# Patient Record
Sex: FEMALE | Race: BLACK OR AFRICAN AMERICAN | NOT HISPANIC OR LATINO
[De-identification: names, ages, dates, MRNs, and addresses within clinical notes are randomized per-mention and may not be internally consistent; named-entity substitution may affect disease eponyms.]

---

## 2018-03-05 PROBLEM — Z00.00 ENCOUNTER FOR PREVENTIVE HEALTH EXAMINATION: Status: ACTIVE | Noted: 2018-03-05

## 2018-04-02 ENCOUNTER — APPOINTMENT (OUTPATIENT)
Dept: CARDIOLOGY | Facility: CLINIC | Age: 83
End: 2018-04-02

## 2018-06-14 ENCOUNTER — OUTPATIENT (OUTPATIENT)
Dept: OUTPATIENT SERVICES | Facility: HOSPITAL | Age: 83
LOS: 1 days | End: 2018-06-14
Payer: MEDICARE

## 2018-06-14 VITALS
TEMPERATURE: 98 F | OXYGEN SATURATION: 97 % | HEART RATE: 82 BPM | RESPIRATION RATE: 20 BRPM | SYSTOLIC BLOOD PRESSURE: 169 MMHG | HEIGHT: 66 IN | WEIGHT: 132.06 LBS | DIASTOLIC BLOOD PRESSURE: 81 MMHG

## 2018-06-14 DIAGNOSIS — E89.0 POSTPROCEDURAL HYPOTHYROIDISM: Chronic | ICD-10-CM

## 2018-06-14 DIAGNOSIS — Z90.710 ACQUIRED ABSENCE OF BOTH CERVIX AND UTERUS: Chronic | ICD-10-CM

## 2018-06-14 DIAGNOSIS — Z98.49 CATARACT EXTRACTION STATUS, UNSPECIFIED EYE: Chronic | ICD-10-CM

## 2018-06-14 DIAGNOSIS — R94.39 ABNORMAL RESULT OF OTHER CARDIOVASCULAR FUNCTION STUDY: ICD-10-CM

## 2018-06-14 LAB
ANION GAP SERPL CALC-SCNC: 14 MMOL/L — SIGNIFICANT CHANGE UP (ref 5–17)
BUN SERPL-MCNC: 22 MG/DL — SIGNIFICANT CHANGE UP (ref 7–23)
CALCIUM SERPL-MCNC: 9.6 MG/DL — SIGNIFICANT CHANGE UP (ref 8.4–10.5)
CHLORIDE SERPL-SCNC: 99 MMOL/L — SIGNIFICANT CHANGE UP (ref 96–108)
CO2 SERPL-SCNC: 28 MMOL/L — SIGNIFICANT CHANGE UP (ref 22–31)
CREAT SERPL-MCNC: 1.5 MG/DL — HIGH (ref 0.5–1.3)
GLUCOSE BLDC GLUCOMTR-MCNC: 132 MG/DL — HIGH (ref 70–99)
GLUCOSE SERPL-MCNC: 134 MG/DL — HIGH (ref 70–99)
HCT VFR BLD CALC: 38.9 % — SIGNIFICANT CHANGE UP (ref 34.5–45)
HGB BLD-MCNC: 12.7 G/DL — SIGNIFICANT CHANGE UP (ref 11.5–15.5)
MCHC RBC-ENTMCNC: 26.2 PG — LOW (ref 27–34)
MCHC RBC-ENTMCNC: 32.6 GM/DL — SIGNIFICANT CHANGE UP (ref 32–36)
MCV RBC AUTO: 80.2 FL — SIGNIFICANT CHANGE UP (ref 80–100)
PLATELET # BLD AUTO: 299 K/UL — SIGNIFICANT CHANGE UP (ref 150–400)
POTASSIUM SERPL-MCNC: 4.2 MMOL/L — SIGNIFICANT CHANGE UP (ref 3.5–5.3)
POTASSIUM SERPL-SCNC: 4.2 MMOL/L — SIGNIFICANT CHANGE UP (ref 3.5–5.3)
RBC # BLD: 4.85 M/UL — SIGNIFICANT CHANGE UP (ref 3.8–5.2)
RBC # FLD: 19.1 % — HIGH (ref 10.3–14.5)
SODIUM SERPL-SCNC: 141 MMOL/L — SIGNIFICANT CHANGE UP (ref 135–145)
WBC # BLD: 5.6 K/UL — SIGNIFICANT CHANGE UP (ref 3.8–10.5)
WBC # FLD AUTO: 5.6 K/UL — SIGNIFICANT CHANGE UP (ref 3.8–10.5)

## 2018-06-14 PROCEDURE — 93454 CORONARY ARTERY ANGIO S&I: CPT

## 2018-06-14 PROCEDURE — C1887: CPT

## 2018-06-14 PROCEDURE — 93454 CORONARY ARTERY ANGIO S&I: CPT | Mod: 26

## 2018-06-14 PROCEDURE — 93005 ELECTROCARDIOGRAM TRACING: CPT

## 2018-06-14 PROCEDURE — 85027 COMPLETE CBC AUTOMATED: CPT

## 2018-06-14 PROCEDURE — 80048 BASIC METABOLIC PNL TOTAL CA: CPT

## 2018-06-14 PROCEDURE — 82962 GLUCOSE BLOOD TEST: CPT

## 2018-06-14 PROCEDURE — C1894: CPT

## 2018-06-14 PROCEDURE — 93010 ELECTROCARDIOGRAM REPORT: CPT

## 2018-06-14 PROCEDURE — C1769: CPT

## 2018-06-14 NOTE — H&P CARDIOLOGY - HISTORY OF PRESENT ILLNESS
echo showed severe LV dysfunction & stress test done reveals ischemia in LAD with EF ~30% 89 y/o female with PMHx of HTN, DM type 2, HLD, now p/w c/o HART / SOB seen & evaluated bu Dr Capellan who in turn referred her to cardiologist Dr medina, echo showed severe LV dysfunction & stress test done reveals ischemia in LAD with EF ~30% & now for cardiac cath.

## 2018-06-14 NOTE — H&P CARDIOLOGY - PMH
CAD (coronary artery disease)    Diabetes mellitus, type 2    HLD (hyperlipidemia)    HTN (hypertension), benign    Hypothyroidism

## 2022-01-01 ENCOUNTER — INPATIENT (INPATIENT)
Facility: HOSPITAL | Age: 87
LOS: 3 days | Discharge: HOSPICE | End: 2022-02-17
Attending: FAMILY MEDICINE | Admitting: FAMILY MEDICINE
Payer: MEDICARE

## 2022-01-01 ENCOUNTER — INPATIENT (INPATIENT)
Facility: HOSPITAL | Age: 87
LOS: 0 days | End: 2022-02-18
Attending: INTERNAL MEDICINE | Admitting: INTERNAL MEDICINE
Payer: OTHER MISCELLANEOUS

## 2022-01-01 ENCOUNTER — TRANSCRIPTION ENCOUNTER (OUTPATIENT)
Age: 87
End: 2022-01-01

## 2022-01-01 VITALS
SYSTOLIC BLOOD PRESSURE: 127 MMHG | OXYGEN SATURATION: 98 % | WEIGHT: 134.92 LBS | RESPIRATION RATE: 22 BRPM | DIASTOLIC BLOOD PRESSURE: 75 MMHG | HEART RATE: 80 BPM | HEIGHT: 66 IN

## 2022-01-01 VITALS — RESPIRATION RATE: 20 BRPM | OXYGEN SATURATION: 95 %

## 2022-01-01 VITALS — DIASTOLIC BLOOD PRESSURE: 71 MMHG | SYSTOLIC BLOOD PRESSURE: 96 MMHG | HEART RATE: 103 BPM

## 2022-01-01 VITALS — HEART RATE: 57 BPM | RESPIRATION RATE: 10 BRPM | TEMPERATURE: 96 F | OXYGEN SATURATION: 94 %

## 2022-01-01 DIAGNOSIS — Z29.9 ENCOUNTER FOR PROPHYLACTIC MEASURES, UNSPECIFIED: ICD-10-CM

## 2022-01-01 DIAGNOSIS — E78.5 HYPERLIPIDEMIA, UNSPECIFIED: ICD-10-CM

## 2022-01-01 DIAGNOSIS — G93.41 METABOLIC ENCEPHALOPATHY: ICD-10-CM

## 2022-01-01 DIAGNOSIS — Z45.2 ENCOUNTER FOR ADJUSTMENT AND MANAGEMENT OF VASCULAR ACCESS DEVICE: ICD-10-CM

## 2022-01-01 DIAGNOSIS — R79.89 OTHER SPECIFIED ABNORMAL FINDINGS OF BLOOD CHEMISTRY: ICD-10-CM

## 2022-01-01 DIAGNOSIS — Z98.49 CATARACT EXTRACTION STATUS, UNSPECIFIED EYE: Chronic | ICD-10-CM

## 2022-01-01 DIAGNOSIS — Z90.710 ACQUIRED ABSENCE OF BOTH CERVIX AND UTERUS: Chronic | ICD-10-CM

## 2022-01-01 DIAGNOSIS — E89.0 POSTPROCEDURAL HYPOTHYROIDISM: Chronic | ICD-10-CM

## 2022-01-01 DIAGNOSIS — R77.8 OTHER SPECIFIED ABNORMALITIES OF PLASMA PROTEINS: ICD-10-CM

## 2022-01-01 DIAGNOSIS — N17.9 ACUTE KIDNEY FAILURE, UNSPECIFIED: ICD-10-CM

## 2022-01-01 DIAGNOSIS — I50.23 ACUTE ON CHRONIC SYSTOLIC (CONGESTIVE) HEART FAILURE: ICD-10-CM

## 2022-01-01 DIAGNOSIS — I10 ESSENTIAL (PRIMARY) HYPERTENSION: ICD-10-CM

## 2022-01-01 DIAGNOSIS — Z51.5 ENCOUNTER FOR PALLIATIVE CARE: ICD-10-CM

## 2022-01-01 DIAGNOSIS — E03.9 HYPOTHYROIDISM, UNSPECIFIED: ICD-10-CM

## 2022-01-01 DIAGNOSIS — R06.02 SHORTNESS OF BREATH: ICD-10-CM

## 2022-01-01 LAB
A1C WITH ESTIMATED AVERAGE GLUCOSE RESULT: 6.5 % — HIGH (ref 4–5.6)
ALBUMIN SERPL ELPH-MCNC: 2.7 G/DL — LOW (ref 3.3–5)
ALBUMIN SERPL ELPH-MCNC: 2.7 G/DL — LOW (ref 3.3–5)
ALP SERPL-CCNC: 153 U/L — HIGH (ref 40–120)
ALP SERPL-CCNC: 166 U/L — HIGH (ref 40–120)
ALT FLD-CCNC: 20 U/L — SIGNIFICANT CHANGE UP (ref 12–78)
ALT FLD-CCNC: 23 U/L — SIGNIFICANT CHANGE UP (ref 12–78)
ANA PAT FLD IF-IMP: ABNORMAL
ANA TITR SER: ABNORMAL
ANION GAP SERPL CALC-SCNC: 8 MMOL/L — SIGNIFICANT CHANGE UP (ref 5–17)
ANION GAP SERPL CALC-SCNC: 9 MMOL/L — SIGNIFICANT CHANGE UP (ref 5–17)
ANION GAP SERPL CALC-SCNC: 9 MMOL/L — SIGNIFICANT CHANGE UP (ref 5–17)
ANISOCYTOSIS BLD QL: SIGNIFICANT CHANGE UP
APPEARANCE UR: CLEAR — SIGNIFICANT CHANGE UP
AST SERPL-CCNC: 34 U/L — SIGNIFICANT CHANGE UP (ref 15–37)
AST SERPL-CCNC: 40 U/L — HIGH (ref 15–37)
BACTERIA # UR AUTO: ABNORMAL
BASOPHILS # BLD AUTO: 0 K/UL — SIGNIFICANT CHANGE UP (ref 0–0.2)
BASOPHILS # BLD AUTO: 0.02 K/UL — SIGNIFICANT CHANGE UP (ref 0–0.2)
BASOPHILS NFR BLD AUTO: 0 % — SIGNIFICANT CHANGE UP (ref 0–2)
BASOPHILS NFR BLD AUTO: 0.3 % — SIGNIFICANT CHANGE UP (ref 0–2)
BILIRUB SERPL-MCNC: 0.9 MG/DL — SIGNIFICANT CHANGE UP (ref 0.2–1.2)
BILIRUB SERPL-MCNC: 1.3 MG/DL — HIGH (ref 0.2–1.2)
BILIRUB UR-MCNC: ABNORMAL
BUN SERPL-MCNC: 71 MG/DL — HIGH (ref 7–23)
BUN SERPL-MCNC: 81 MG/DL — HIGH (ref 7–23)
BUN SERPL-MCNC: 89 MG/DL — HIGH (ref 7–23)
CALCIUM SERPL-MCNC: 8.8 MG/DL — SIGNIFICANT CHANGE UP (ref 8.5–10.1)
CALCIUM SERPL-MCNC: 9.2 MG/DL — SIGNIFICANT CHANGE UP (ref 8.5–10.1)
CALCIUM SERPL-MCNC: 9.5 MG/DL — SIGNIFICANT CHANGE UP (ref 8.5–10.1)
CHLORIDE SERPL-SCNC: 102 MMOL/L — SIGNIFICANT CHANGE UP (ref 96–108)
CHLORIDE SERPL-SCNC: 103 MMOL/L — SIGNIFICANT CHANGE UP (ref 96–108)
CHLORIDE SERPL-SCNC: 103 MMOL/L — SIGNIFICANT CHANGE UP (ref 96–108)
CO2 SERPL-SCNC: 22 MMOL/L — SIGNIFICANT CHANGE UP (ref 22–31)
CO2 SERPL-SCNC: 23 MMOL/L — SIGNIFICANT CHANGE UP (ref 22–31)
CO2 SERPL-SCNC: 25 MMOL/L — SIGNIFICANT CHANGE UP (ref 22–31)
COLOR SPEC: YELLOW — SIGNIFICANT CHANGE UP
CREAT SERPL-MCNC: 3.02 MG/DL — HIGH (ref 0.5–1.3)
CREAT SERPL-MCNC: 3.63 MG/DL — HIGH (ref 0.5–1.3)
CREAT SERPL-MCNC: 4.11 MG/DL — HIGH (ref 0.5–1.3)
D DIMER BLD IA.RAPID-MCNC: 2721 NG/ML DDU — HIGH
DIFF PNL FLD: ABNORMAL
EOSINOPHIL # BLD AUTO: 0.01 K/UL — SIGNIFICANT CHANGE UP (ref 0–0.5)
EOSINOPHIL # BLD AUTO: 0.05 K/UL — SIGNIFICANT CHANGE UP (ref 0–0.5)
EOSINOPHIL NFR BLD AUTO: 0.1 % — SIGNIFICANT CHANGE UP (ref 0–6)
EOSINOPHIL NFR BLD AUTO: 1 % — SIGNIFICANT CHANGE UP (ref 0–6)
EPI CELLS # UR: ABNORMAL
ESTIMATED AVERAGE GLUCOSE: 140 MG/DL — HIGH (ref 68–114)
FERRITIN SERPL-MCNC: 306 NG/ML — HIGH (ref 15–150)
GLUCOSE BLDC GLUCOMTR-MCNC: 101 MG/DL — HIGH (ref 70–99)
GLUCOSE BLDC GLUCOMTR-MCNC: 101 MG/DL — HIGH (ref 70–99)
GLUCOSE BLDC GLUCOMTR-MCNC: 103 MG/DL — HIGH (ref 70–99)
GLUCOSE BLDC GLUCOMTR-MCNC: 111 MG/DL — HIGH (ref 70–99)
GLUCOSE BLDC GLUCOMTR-MCNC: 117 MG/DL — HIGH (ref 70–99)
GLUCOSE BLDC GLUCOMTR-MCNC: 129 MG/DL — HIGH (ref 70–99)
GLUCOSE BLDC GLUCOMTR-MCNC: 133 MG/DL — HIGH (ref 70–99)
GLUCOSE BLDC GLUCOMTR-MCNC: 67 MG/DL — LOW (ref 70–99)
GLUCOSE BLDC GLUCOMTR-MCNC: 67 MG/DL — LOW (ref 70–99)
GLUCOSE BLDC GLUCOMTR-MCNC: 70 MG/DL — SIGNIFICANT CHANGE UP (ref 70–99)
GLUCOSE BLDC GLUCOMTR-MCNC: 75 MG/DL — SIGNIFICANT CHANGE UP (ref 70–99)
GLUCOSE BLDC GLUCOMTR-MCNC: 76 MG/DL — SIGNIFICANT CHANGE UP (ref 70–99)
GLUCOSE BLDC GLUCOMTR-MCNC: 83 MG/DL — SIGNIFICANT CHANGE UP (ref 70–99)
GLUCOSE BLDC GLUCOMTR-MCNC: 93 MG/DL — SIGNIFICANT CHANGE UP (ref 70–99)
GLUCOSE BLDC GLUCOMTR-MCNC: 94 MG/DL — SIGNIFICANT CHANGE UP (ref 70–99)
GLUCOSE BLDC GLUCOMTR-MCNC: 95 MG/DL — SIGNIFICANT CHANGE UP (ref 70–99)
GLUCOSE BLDC GLUCOMTR-MCNC: 96 MG/DL — SIGNIFICANT CHANGE UP (ref 70–99)
GLUCOSE SERPL-MCNC: 121 MG/DL — HIGH (ref 70–99)
GLUCOSE SERPL-MCNC: 130 MG/DL — HIGH (ref 70–99)
GLUCOSE SERPL-MCNC: 69 MG/DL — LOW (ref 70–99)
GLUCOSE UR QL: NEGATIVE MG/DL — SIGNIFICANT CHANGE UP
HAV IGM SER-ACNC: SIGNIFICANT CHANGE UP
HBV CORE IGM SER-ACNC: SIGNIFICANT CHANGE UP
HBV SURFACE AG SER-ACNC: SIGNIFICANT CHANGE UP
HCT VFR BLD CALC: 45.8 % — HIGH (ref 34.5–45)
HCT VFR BLD CALC: 46.3 % — HIGH (ref 34.5–45)
HCV AB S/CO SERPL IA: 0.14 S/CO — SIGNIFICANT CHANGE UP (ref 0–0.99)
HCV AB SERPL-IMP: SIGNIFICANT CHANGE UP
HGB BLD-MCNC: 14 G/DL — SIGNIFICANT CHANGE UP (ref 11.5–15.5)
HGB BLD-MCNC: 14.3 G/DL — SIGNIFICANT CHANGE UP (ref 11.5–15.5)
IMM GRANULOCYTES NFR BLD AUTO: 0.9 % — SIGNIFICANT CHANGE UP (ref 0–1.5)
INR BLD: 1.31 RATIO — HIGH (ref 0.88–1.16)
IRON SATN MFR SERPL: 19 UG/DL — LOW (ref 30–160)
IRON SATN MFR SERPL: 8 % — LOW (ref 14–50)
KETONES UR-MCNC: ABNORMAL
LEUKOCYTE ESTERASE UR-ACNC: ABNORMAL
LG PLATELETS BLD QL AUTO: SLIGHT — SIGNIFICANT CHANGE UP
LIDOCAIN IGE QN: 186 U/L — SIGNIFICANT CHANGE UP (ref 73–393)
LYMPHOCYTES # BLD AUTO: 0.28 K/UL — LOW (ref 1–3.3)
LYMPHOCYTES # BLD AUTO: 0.29 K/UL — LOW (ref 1–3.3)
LYMPHOCYTES # BLD AUTO: 3.8 % — LOW (ref 13–44)
LYMPHOCYTES # BLD AUTO: 6 % — LOW (ref 13–44)
MACROCYTES BLD QL: SLIGHT — SIGNIFICANT CHANGE UP
MAGNESIUM SERPL-MCNC: 2.5 MG/DL — SIGNIFICANT CHANGE UP (ref 1.6–2.6)
MAGNESIUM SERPL-MCNC: 2.6 MG/DL — SIGNIFICANT CHANGE UP (ref 1.6–2.6)
MAGNESIUM SERPL-MCNC: 3 MG/DL — HIGH (ref 1.6–2.6)
MANUAL SMEAR VERIFICATION: SIGNIFICANT CHANGE UP
MCHC RBC-ENTMCNC: 29.2 PG — SIGNIFICANT CHANGE UP (ref 27–34)
MCHC RBC-ENTMCNC: 29.5 PG — SIGNIFICANT CHANGE UP (ref 27–34)
MCHC RBC-ENTMCNC: 30.6 G/DL — LOW (ref 32–36)
MCHC RBC-ENTMCNC: 30.9 G/DL — LOW (ref 32–36)
MCV RBC AUTO: 94.7 FL — SIGNIFICANT CHANGE UP (ref 80–100)
MCV RBC AUTO: 96.6 FL — SIGNIFICANT CHANGE UP (ref 80–100)
MITOCHONDRIA AB SER-ACNC: SIGNIFICANT CHANGE UP
MONOCYTES # BLD AUTO: 0.51 K/UL — SIGNIFICANT CHANGE UP (ref 0–0.9)
MONOCYTES # BLD AUTO: 1.02 K/UL — HIGH (ref 0–0.9)
MONOCYTES NFR BLD AUTO: 11 % — SIGNIFICANT CHANGE UP (ref 2–14)
MONOCYTES NFR BLD AUTO: 13.5 % — SIGNIFICANT CHANGE UP (ref 2–14)
NEUTROPHILS # BLD AUTO: 3.79 K/UL — SIGNIFICANT CHANGE UP (ref 1.8–7.4)
NEUTROPHILS # BLD AUTO: 6.14 K/UL — SIGNIFICANT CHANGE UP (ref 1.8–7.4)
NEUTROPHILS NFR BLD AUTO: 81.4 % — HIGH (ref 43–77)
NEUTROPHILS NFR BLD AUTO: 82 % — HIGH (ref 43–77)
NEUTS VAC BLD QL SMEAR: SLIGHT — SIGNIFICANT CHANGE UP
NITRITE UR-MCNC: NEGATIVE — SIGNIFICANT CHANGE UP
NRBC # BLD: 0 /100 — SIGNIFICANT CHANGE UP (ref 0–0)
NRBC # BLD: 4 /100 WBCS — HIGH (ref 0–0)
NRBC # BLD: SIGNIFICANT CHANGE UP /100 WBCS (ref 0–0)
NT-PROBNP SERPL-SCNC: HIGH PG/ML (ref 0–450)
PH UR: 5 — SIGNIFICANT CHANGE UP (ref 5–8)
PHOSPHATE SERPL-MCNC: 6.5 MG/DL — HIGH (ref 2.5–4.5)
PHOSPHATE SERPL-MCNC: 6.6 MG/DL — HIGH (ref 2.5–4.5)
PLAT MORPH BLD: NORMAL — SIGNIFICANT CHANGE UP
PLATELET # BLD AUTO: 254 K/UL — SIGNIFICANT CHANGE UP (ref 150–400)
PLATELET # BLD AUTO: 266 K/UL — SIGNIFICANT CHANGE UP (ref 150–400)
POIKILOCYTOSIS BLD QL AUTO: SLIGHT — SIGNIFICANT CHANGE UP
POLYCHROMASIA BLD QL SMEAR: SLIGHT — SIGNIFICANT CHANGE UP
POTASSIUM SERPL-MCNC: 5 MMOL/L — SIGNIFICANT CHANGE UP (ref 3.5–5.3)
POTASSIUM SERPL-MCNC: 5.8 MMOL/L — HIGH (ref 3.5–5.3)
POTASSIUM SERPL-MCNC: 6.1 MMOL/L — HIGH (ref 3.5–5.3)
POTASSIUM SERPL-SCNC: 5 MMOL/L — SIGNIFICANT CHANGE UP (ref 3.5–5.3)
POTASSIUM SERPL-SCNC: 5.8 MMOL/L — HIGH (ref 3.5–5.3)
POTASSIUM SERPL-SCNC: 6.1 MMOL/L — HIGH (ref 3.5–5.3)
PROT SERPL-MCNC: 7.1 GM/DL — SIGNIFICANT CHANGE UP (ref 6–8.3)
PROT SERPL-MCNC: 7.3 GM/DL — SIGNIFICANT CHANGE UP (ref 6–8.3)
PROT UR-MCNC: 100 MG/DL
PROTHROM AB SERPL-ACNC: 15 SEC — HIGH (ref 10.6–13.6)
RAPID RVP RESULT: SIGNIFICANT CHANGE UP
RBC # BLD: 4.74 M/UL — SIGNIFICANT CHANGE UP (ref 3.8–5.2)
RBC # BLD: 4.89 M/UL — SIGNIFICANT CHANGE UP (ref 3.8–5.2)
RBC # FLD: 17.6 % — HIGH (ref 10.3–14.5)
RBC # FLD: 17.9 % — HIGH (ref 10.3–14.5)
RBC BLD AUTO: ABNORMAL
RBC CASTS # UR COMP ASSIST: ABNORMAL /HPF (ref 0–4)
SARS-COV-2 RNA SPEC QL NAA+PROBE: SIGNIFICANT CHANGE UP
SMOOTH MUSCLE AB SER-ACNC: SIGNIFICANT CHANGE UP
SODIUM SERPL-SCNC: 134 MMOL/L — LOW (ref 135–145)
SODIUM SERPL-SCNC: 135 MMOL/L — SIGNIFICANT CHANGE UP (ref 135–145)
SODIUM SERPL-SCNC: 135 MMOL/L — SIGNIFICANT CHANGE UP (ref 135–145)
SP GR SPEC: 1.02 — SIGNIFICANT CHANGE UP (ref 1.01–1.02)
T4 FREE SERPL-MCNC: 0.5 NG/DL — LOW (ref 0.9–1.8)
TIBC SERPL-MCNC: 231 UG/DL — SIGNIFICANT CHANGE UP (ref 220–430)
TROPONIN I, HIGH SENSITIVITY RESULT: 147.3 NG/L — HIGH
TROPONIN I, HIGH SENSITIVITY RESULT: 88.1 NG/L — HIGH
TROPONIN I, HIGH SENSITIVITY RESULT: 95.8 NG/L — HIGH
TSH SERPL-MCNC: 10.4 UIU/ML — HIGH (ref 0.36–3.74)
UIBC SERPL-MCNC: 212 UG/DL — SIGNIFICANT CHANGE UP (ref 110–370)
UROBILINOGEN FLD QL: 1 MG/DL
WBC # BLD: 4.62 K/UL — SIGNIFICANT CHANGE UP (ref 3.8–10.5)
WBC # BLD: 7.55 K/UL — SIGNIFICANT CHANGE UP (ref 3.8–10.5)
WBC # FLD AUTO: 4.62 K/UL — SIGNIFICANT CHANGE UP (ref 3.8–10.5)
WBC # FLD AUTO: 7.55 K/UL — SIGNIFICANT CHANGE UP (ref 3.8–10.5)
WBC UR QL: ABNORMAL

## 2022-01-01 PROCEDURE — 99222 1ST HOSP IP/OBS MODERATE 55: CPT

## 2022-01-01 PROCEDURE — 49083 ABD PARACENTESIS W/IMAGING: CPT

## 2022-01-01 PROCEDURE — 36556 INSERT NON-TUNNEL CV CATH: CPT

## 2022-01-01 PROCEDURE — 99232 SBSQ HOSP IP/OBS MODERATE 35: CPT

## 2022-01-01 PROCEDURE — 99223 1ST HOSP IP/OBS HIGH 75: CPT

## 2022-01-01 PROCEDURE — 51702 INSERT TEMP BLADDER CATH: CPT

## 2022-01-01 PROCEDURE — 76700 US EXAM ABDOM COMPLETE: CPT | Mod: 26

## 2022-01-01 PROCEDURE — 71045 X-RAY EXAM CHEST 1 VIEW: CPT | Mod: 26

## 2022-01-01 PROCEDURE — 93010 ELECTROCARDIOGRAM REPORT: CPT

## 2022-01-01 PROCEDURE — 76937 US GUIDE VASCULAR ACCESS: CPT | Mod: 26,59

## 2022-01-01 PROCEDURE — 99233 SBSQ HOSP IP/OBS HIGH 50: CPT

## 2022-01-01 PROCEDURE — 99239 HOSP IP/OBS DSCHRG MGMT >30: CPT

## 2022-01-01 PROCEDURE — 99291 CRITICAL CARE FIRST HOUR: CPT

## 2022-01-01 PROCEDURE — 93306 TTE W/DOPPLER COMPLETE: CPT | Mod: 26

## 2022-01-01 RX ORDER — HYDROMORPHONE HYDROCHLORIDE 2 MG/ML
0.5 INJECTION INTRAMUSCULAR; INTRAVENOUS; SUBCUTANEOUS EVERY 6 HOURS
Refills: 0 | Status: DISCONTINUED | OUTPATIENT
Start: 2022-01-01 | End: 2022-01-01

## 2022-01-01 RX ORDER — SODIUM CHLORIDE 9 MG/ML
1000 INJECTION, SOLUTION INTRAVENOUS
Refills: 0 | Status: DISCONTINUED | OUTPATIENT
Start: 2022-01-01 | End: 2022-01-01

## 2022-01-01 RX ORDER — PIPERACILLIN AND TAZOBACTAM 4; .5 G/20ML; G/20ML
3.38 INJECTION, POWDER, LYOPHILIZED, FOR SOLUTION INTRAVENOUS ONCE
Refills: 0 | Status: COMPLETED | OUTPATIENT
Start: 2022-01-01 | End: 2022-01-01

## 2022-01-01 RX ORDER — DEXTROSE 50 % IN WATER 50 %
50 SYRINGE (ML) INTRAVENOUS ONCE
Refills: 0 | Status: COMPLETED | OUTPATIENT
Start: 2022-01-01 | End: 2022-01-01

## 2022-01-01 RX ORDER — SITAGLIPTIN 50 MG/1
1 TABLET, FILM COATED ORAL
Qty: 0 | Refills: 0 | DISCHARGE

## 2022-01-01 RX ORDER — INSULIN LISPRO 100/ML
VIAL (ML) SUBCUTANEOUS
Refills: 0 | Status: DISCONTINUED | OUTPATIENT
Start: 2022-01-01 | End: 2022-01-01

## 2022-01-01 RX ORDER — ATORVASTATIN CALCIUM 80 MG/1
0 TABLET, FILM COATED ORAL
Qty: 0 | Refills: 0 | DISCHARGE

## 2022-01-01 RX ORDER — ACETAMINOPHEN 500 MG
1000 TABLET ORAL ONCE
Refills: 0 | Status: COMPLETED | OUTPATIENT
Start: 2022-01-01 | End: 2022-01-01

## 2022-01-01 RX ORDER — ATORVASTATIN CALCIUM 80 MG/1
1 TABLET, FILM COATED ORAL
Qty: 0 | Refills: 0 | DISCHARGE

## 2022-01-01 RX ORDER — FUROSEMIDE 40 MG
60 TABLET ORAL ONCE
Refills: 0 | Status: COMPLETED | OUTPATIENT
Start: 2022-01-01 | End: 2022-01-01

## 2022-01-01 RX ORDER — LEVOTHYROXINE SODIUM 125 MCG
1 TABLET ORAL
Qty: 0 | Refills: 0 | DISCHARGE

## 2022-01-01 RX ORDER — MAGNESIUM HYDROXIDE 400 MG/1
30 TABLET, CHEWABLE ORAL DAILY
Refills: 0 | Status: DISCONTINUED | OUTPATIENT
Start: 2022-01-01 | End: 2022-01-01

## 2022-01-01 RX ORDER — DEXTROSE 50 % IN WATER 50 %
12.5 SYRINGE (ML) INTRAVENOUS ONCE
Refills: 0 | Status: DISCONTINUED | OUTPATIENT
Start: 2022-01-01 | End: 2022-01-01

## 2022-01-01 RX ORDER — RANOLAZINE 500 MG/1
1 TABLET, FILM COATED, EXTENDED RELEASE ORAL
Qty: 0 | Refills: 0 | DISCHARGE

## 2022-01-01 RX ORDER — HYDROMORPHONE HYDROCHLORIDE 2 MG/ML
1 INJECTION INTRAMUSCULAR; INTRAVENOUS; SUBCUTANEOUS
Refills: 0 | Status: DISCONTINUED | OUTPATIENT
Start: 2022-01-01 | End: 2022-01-01

## 2022-01-01 RX ORDER — HEPARIN SODIUM 5000 [USP'U]/ML
5000 INJECTION INTRAVENOUS; SUBCUTANEOUS EVERY 12 HOURS
Refills: 0 | Status: DISCONTINUED | OUTPATIENT
Start: 2022-01-01 | End: 2022-01-01

## 2022-01-01 RX ORDER — SODIUM POLYSTYRENE SULFONATE 4.1 MEQ/G
30 POWDER, FOR SUSPENSION ORAL ONCE
Refills: 0 | Status: COMPLETED | OUTPATIENT
Start: 2022-01-01 | End: 2022-01-01

## 2022-01-01 RX ORDER — DEXTROSE 50 % IN WATER 50 %
25 SYRINGE (ML) INTRAVENOUS ONCE
Refills: 0 | Status: DISCONTINUED | OUTPATIENT
Start: 2022-01-01 | End: 2022-01-01

## 2022-01-01 RX ORDER — FUROSEMIDE 40 MG
1 TABLET ORAL
Qty: 0 | Refills: 0 | DISCHARGE

## 2022-01-01 RX ORDER — AMLODIPINE BESYLATE 2.5 MG/1
0 TABLET ORAL
Qty: 0 | Refills: 0 | DISCHARGE

## 2022-01-01 RX ORDER — FUROSEMIDE 40 MG
20 TABLET ORAL ONCE
Refills: 0 | Status: COMPLETED | OUTPATIENT
Start: 2022-01-01 | End: 2022-01-01

## 2022-01-01 RX ORDER — ASPIRIN/CALCIUM CARB/MAGNESIUM 324 MG
324 TABLET ORAL ONCE
Refills: 0 | Status: COMPLETED | OUTPATIENT
Start: 2022-01-01 | End: 2022-01-01

## 2022-01-01 RX ORDER — ASPIRIN/CALCIUM CARB/MAGNESIUM 324 MG
1 TABLET ORAL
Qty: 0 | Refills: 0 | DISCHARGE

## 2022-01-01 RX ORDER — HYDROMORPHONE HYDROCHLORIDE 2 MG/ML
0.25 INJECTION INTRAMUSCULAR; INTRAVENOUS; SUBCUTANEOUS EVERY 8 HOURS
Refills: 0 | Status: DISCONTINUED | OUTPATIENT
Start: 2022-01-01 | End: 2022-01-01

## 2022-01-01 RX ORDER — INFLUENZA VIRUS VACCINE 15; 15; 15; 15 UG/.5ML; UG/.5ML; UG/.5ML; UG/.5ML
0.7 SUSPENSION INTRAMUSCULAR ONCE
Refills: 0 | Status: DISCONTINUED | OUTPATIENT
Start: 2022-01-01 | End: 2022-01-01

## 2022-01-01 RX ORDER — ATORVASTATIN CALCIUM 80 MG/1
10 TABLET, FILM COATED ORAL AT BEDTIME
Refills: 0 | Status: DISCONTINUED | OUTPATIENT
Start: 2022-01-01 | End: 2022-01-01

## 2022-01-01 RX ORDER — LEVOTHYROXINE SODIUM 125 MCG
50 TABLET ORAL DAILY
Refills: 0 | Status: DISCONTINUED | OUTPATIENT
Start: 2022-01-01 | End: 2022-01-01

## 2022-01-01 RX ORDER — VALSARTAN 80 MG/1
1 TABLET ORAL
Qty: 0 | Refills: 0 | DISCHARGE

## 2022-01-01 RX ORDER — LEVOTHYROXINE SODIUM 125 MCG
0 TABLET ORAL
Qty: 0 | Refills: 0 | DISCHARGE

## 2022-01-01 RX ORDER — FUROSEMIDE 40 MG
40 TABLET ORAL
Refills: 0 | Status: DISCONTINUED | OUTPATIENT
Start: 2022-01-01 | End: 2022-01-01

## 2022-01-01 RX ORDER — METOPROLOL TARTRATE 50 MG
0 TABLET ORAL
Qty: 0 | Refills: 0 | DISCHARGE

## 2022-01-01 RX ORDER — HYDROMORPHONE HYDROCHLORIDE 2 MG/ML
0.5 INJECTION INTRAMUSCULAR; INTRAVENOUS; SUBCUTANEOUS EVERY 8 HOURS
Refills: 0 | Status: DISCONTINUED | OUTPATIENT
Start: 2022-01-01 | End: 2022-01-01

## 2022-01-01 RX ORDER — METOPROLOL TARTRATE 50 MG
25 TABLET ORAL
Refills: 0 | Status: DISCONTINUED | OUTPATIENT
Start: 2022-01-01 | End: 2022-01-01

## 2022-01-01 RX ORDER — GLUCAGON INJECTION, SOLUTION 0.5 MG/.1ML
1 INJECTION, SOLUTION SUBCUTANEOUS ONCE
Refills: 0 | Status: DISCONTINUED | OUTPATIENT
Start: 2022-01-01 | End: 2022-01-01

## 2022-01-01 RX ORDER — FUROSEMIDE 40 MG
40 TABLET ORAL
Qty: 0 | Refills: 0 | DISCHARGE
Start: 2022-01-01

## 2022-01-01 RX ORDER — METOPROLOL TARTRATE 50 MG
1 TABLET ORAL
Qty: 0 | Refills: 0 | DISCHARGE

## 2022-01-01 RX ORDER — VANCOMYCIN HCL 1 G
1000 VIAL (EA) INTRAVENOUS ONCE
Refills: 0 | Status: COMPLETED | OUTPATIENT
Start: 2022-01-01 | End: 2022-01-01

## 2022-01-01 RX ORDER — DEXTROSE 50 % IN WATER 50 %
15 SYRINGE (ML) INTRAVENOUS ONCE
Refills: 0 | Status: DISCONTINUED | OUTPATIENT
Start: 2022-01-01 | End: 2022-01-01

## 2022-01-01 RX ADMIN — Medication 50 MILLILITER(S): at 14:24

## 2022-01-01 RX ADMIN — Medication 40 MILLIGRAM(S): at 17:19

## 2022-01-01 RX ADMIN — Medication 1000 MILLIGRAM(S): at 06:39

## 2022-01-01 RX ADMIN — SODIUM POLYSTYRENE SULFONATE 30 GRAM(S): 4.1 POWDER, FOR SUSPENSION ORAL at 14:46

## 2022-01-01 RX ADMIN — PIPERACILLIN AND TAZOBACTAM 200 GRAM(S): 4; .5 INJECTION, POWDER, LYOPHILIZED, FOR SOLUTION INTRAVENOUS at 00:59

## 2022-01-01 RX ADMIN — Medication 40 MILLIGRAM(S): at 09:56

## 2022-01-01 RX ADMIN — Medication 50 MICROGRAM(S): at 08:32

## 2022-01-01 RX ADMIN — Medication 250 MILLIGRAM(S): at 01:20

## 2022-01-01 RX ADMIN — Medication 400 MILLIGRAM(S): at 04:43

## 2022-01-01 RX ADMIN — HEPARIN SODIUM 5000 UNIT(S): 5000 INJECTION INTRAVENOUS; SUBCUTANEOUS at 05:55

## 2022-01-01 RX ADMIN — Medication 40 MILLIGRAM(S): at 09:47

## 2022-01-01 RX ADMIN — HYDROMORPHONE HYDROCHLORIDE 0.5 MILLIGRAM(S): 2 INJECTION INTRAMUSCULAR; INTRAVENOUS; SUBCUTANEOUS at 12:50

## 2022-01-01 RX ADMIN — Medication 60 MILLIGRAM(S): at 21:01

## 2022-01-01 RX ADMIN — Medication 40 MILLIGRAM(S): at 05:54

## 2022-01-01 RX ADMIN — HEPARIN SODIUM 5000 UNIT(S): 5000 INJECTION INTRAVENOUS; SUBCUTANEOUS at 17:24

## 2022-01-01 RX ADMIN — Medication 1 MILLIGRAM(S): at 08:29

## 2022-01-01 RX ADMIN — HEPARIN SODIUM 5000 UNIT(S): 5000 INJECTION INTRAVENOUS; SUBCUTANEOUS at 06:20

## 2022-01-01 RX ADMIN — HYDROMORPHONE HYDROCHLORIDE 1 MILLIGRAM(S): 2 INJECTION INTRAMUSCULAR; INTRAVENOUS; SUBCUTANEOUS at 17:09

## 2022-01-01 RX ADMIN — HYDROMORPHONE HYDROCHLORIDE 1 MILLIGRAM(S): 2 INJECTION INTRAMUSCULAR; INTRAVENOUS; SUBCUTANEOUS at 14:45

## 2022-01-01 RX ADMIN — Medication 20 MILLIGRAM(S): at 09:39

## 2022-01-01 RX ADMIN — Medication 324 MILLIGRAM(S): at 21:01

## 2022-01-01 RX ADMIN — HYDROMORPHONE HYDROCHLORIDE 1 MILLIGRAM(S): 2 INJECTION INTRAMUSCULAR; INTRAVENOUS; SUBCUTANEOUS at 14:57

## 2022-01-01 RX ADMIN — SODIUM CHLORIDE 50 MILLILITER(S): 9 INJECTION, SOLUTION INTRAVENOUS at 18:08

## 2022-01-01 RX ADMIN — HYDROMORPHONE HYDROCHLORIDE 0.5 MILLIGRAM(S): 2 INJECTION INTRAMUSCULAR; INTRAVENOUS; SUBCUTANEOUS at 11:25

## 2022-01-01 RX ADMIN — HYDROMORPHONE HYDROCHLORIDE 1 MILLIGRAM(S): 2 INJECTION INTRAMUSCULAR; INTRAVENOUS; SUBCUTANEOUS at 13:02

## 2022-01-01 RX ADMIN — HYDROMORPHONE HYDROCHLORIDE 0.5 MILLIGRAM(S): 2 INJECTION INTRAMUSCULAR; INTRAVENOUS; SUBCUTANEOUS at 08:51

## 2022-01-01 RX ADMIN — Medication 40 MILLIGRAM(S): at 17:25

## 2022-02-13 NOTE — ED ADULT NURSE REASSESSMENT NOTE - NS ED NURSE REASSESS COMMENT FT1
handoff given to issue filder rn, two nurses attempted to get urine via straight cath/butler, was not successful. ed hold rn aware. only pending at this time is ua/c/s, patient was given lasix at 2048 without any urine output since then, diaper dry

## 2022-02-13 NOTE — ED PROVIDER NOTE - PHYSICAL EXAMINATION
Vitals: Oxygen dips into 80's with non-rebreather, improved with deep breath   Gen: AAOx3, NAD, sitting uncomfortably in stretcher, speaking in short sentences   Head: ncat, perrla, eomi b/l  Neck: supple, no lymphadenopathy, no midline deviation  Heart: rrr, no m/r/g  Lungs: b/l LL crackles  Abd: soft, nontender, non-distended, no rebound or guarding  Ext: no clubbing/cyanosis, 3+b/l LE pitting edema  Neuro: sensation and muscle strength intact b/l

## 2022-02-13 NOTE — ED PROVIDER NOTE - CLINICAL SUMMARY MEDICAL DECISION MAKING FREE TEXT BOX
89 yo F with sob, edema, crackles on exam, consistent with chf exacerbation, also concerning for ACS, PE, PNA, covid  -basi clabs, dimer, lipase, mag, rvp/covid, bnp, trop, ua, cx, xr chest, ekg, iv, monitor, asa furosemide   -f/u results, reeval, likely admit to tele

## 2022-02-13 NOTE — ED ADULT NURSE NOTE - OBJECTIVE STATEMENT
received er bed 2 biba from home c/o increased shortness of breath and generalized edema denies chest pain decreased breath sounds b/l hx chf and diabetes alert and oriented to person/place +4 edema b/l arms/hands/legs/feet

## 2022-02-13 NOTE — ED ADULT NURSE NOTE - NSIMPLEMENTINTERV_GEN_ALL_ED
Implemented All Fall with Harm Risk Interventions:  [place] to call system. Call bell, personal items and telephone within reach. Instruct patient to call for assistance. Room bathroom lighting operational. Non-slip footwear when patient is off stretcher. Physically safe environment: no spills, clutter or unnecessary equipment. Stretcher in lowest position, wheels locked, appropriate side rails in place. Provide visual cue, wrist band, yellow gown, etc. Monitor gait and stability. Monitor for mental status changes and reorient to person, place, and time. Review medications for side effects contributing to fall risk. Reinforce activity limits and safety measures with patient and family. Provide visual clues: red socks.

## 2022-02-13 NOTE — ED PROVIDER NOTE - CARE PLAN
1 Principal Discharge DX:	CHF exacerbation   Principal Discharge DX:	CHF exacerbation  Secondary Diagnosis:	Elevated troponin I level  Secondary Diagnosis:	MARINA (acute kidney injury)  Secondary Diagnosis:	Elevated d-dimer

## 2022-02-13 NOTE — ED PROVIDER NOTE - OBJECTIVE STATEMENT
89 yo F with sob, LE edema, retaining fluid.  Pt. was told to come to ER by visiting nurse today.  Pt. has a history of CHF, but it's never been this bad, insidious onset.  No other specific inciting event.  Daughter at bedside corroborates history.   ROS: negative for fever, cough, headache, abd pain, nausea, vomiting, diarrhea, rash, paresthesia, and weakness--all other systems reviewed are negative.   PMH: hypothyroid, HTN, CHF with 30% EF last year, DM, HLD; Meds: levothyroxine, furosemide, metoprolol, amlodipine, atorvastatin; SH: Denies smoking/drinking/drug use

## 2022-02-14 NOTE — H&P ADULT - NSHPPHYSICALEXAM_GEN_ALL_CORE
Physical exam:  General: patient in no acute distress, resting comfortably  Head:  Atraumatic, Normocephalic  Eyes: EOMI, PERRLA, clear sclera  Neck: Supple, thyroid nontender, non enlarged  Cardio: S1/S2 +ve,   Resp: bibasilar rales   GI: abdomen soft, nontender, non distended, no guarding, BS +ve x 4  Ext: 3+ pedal edema bilaterally  Neuro: gross mvmt of all extremities .  Skin: No rashes or lesions

## 2022-02-14 NOTE — PATIENT PROFILE ADULT - FALL HARM RISK - HARM RISK INTERVENTIONS
Assistance with ambulation/Assistance OOB with selected safe patient handling equipment/Communicate Risk of Fall with Harm to all staff/Discuss with provider need for PT consult/Monitor gait and stability/Reinforce activity limits and safety measures with patient and family/Tailored Fall Risk Interventions/Visual Cue: Yellow wristband and red socks/Bed in lowest position, wheels locked, appropriate side rails in place/Call bell, personal items and telephone in reach/Instruct patient to call for assistance before getting out of bed or chair/Non-slip footwear when patient is out of bed/[place] to call system/Physically safe environment - no spills, clutter or unnecessary equipment/Purposeful Proactive Rounding/Room/bathroom lighting operational, light cord in reach

## 2022-02-14 NOTE — CONSULT NOTE ADULT - SUBJECTIVE AND OBJECTIVE BOX
HPI:  Patient is a 89F with a PMH of CHF (EF reportedly 30% last year), HTN, HLD, hypothyroidism who presents to the ED for dyspnea.    Patient currently AAOx2, unable to provide history.  Per ED staff who spoke to family members, patient has had increased dyspnea and lower extremity swelling.  No other complaints.  SpO2 currently 100% on 4L via NC.  Hypothermic in ED - 95.3, rectal.  Labs show elevated creatinine and elevated troponins.  Will admit to tele. (2022 00:39)    PERTINENT PM/SXH:   Congestive heart failure (CHF)    Diabetes    Hypothyroidism        FAMILY HISTORY:  FH: HTN (hypertension)      ITEMS NOT CHECKED ARE NOT PRESENT    SOCIAL HISTORY:   Significant other/partner:  [ ]  Children: yes [ ]  Temple/Spirituality: Christianity   Substance hx:  [ ]   Tobacco hx:  [ ]   Alcohol hx: [ ]   Home Opioid hx:  [x ] I-Stop Reference No: Reference #: [id number]  Living Situation: [ ]Home  [ ]Long term care  [ ]Rehab [ ]Other    ADVANCE DIRECTIVES:    DNR  MOLST  [ ]  Living Will  [ ]   DECISION MAKER(s):  [ ] Health Care Proxy(s)  [x ] Surrogate(s)  [ ] Guardian           Name(s): Phone Number(s): Jaz [phone]    BASELINE (I)ADL(s) (prior to admission):  [place]: [ ]Total  [ ] Moderate [ ]Dependent    Allergies    No Known Allergies    Intolerances    MEDICATIONS  (STANDING):  atorvastatin 10 milliGRAM(s) Oral at bedtime  dextrose 40% Gel 15 Gram(s) Oral once  dextrose 5%. 1000 milliLiter(s) (50 mL/Hr) IV Continuous <Continuous>  dextrose 5%. 1000 milliLiter(s) (100 mL/Hr) IV Continuous <Continuous>  dextrose 50% Injectable 25 Gram(s) IV Push once  dextrose 50% Injectable 12.5 Gram(s) IV Push once  dextrose 50% Injectable 25 Gram(s) IV Push once  furosemide   Injectable 40 milliGRAM(s) IV Push two times a day  glucagon  Injectable 1 milliGRAM(s) IntraMuscular once  heparin   Injectable 5000 Unit(s) SubCutaneous every 12 hours  insulin lispro (ADMELOG) corrective regimen sliding scale   SubCutaneous three times a day before meals  insulin lispro (ADMELOG) corrective regimen sliding scale   SubCutaneous three times a day before meals  levothyroxine 50 MICROGram(s) Oral daily  metoprolol tartrate 25 milliGRAM(s) Oral two times a day    MEDICATIONS  (PRN):    PRESENT SYMPTOMS: [x ]Unable to obtain due to poor mentation   Source if other than patient:  [ ]Family   [ ]Team     Pain: [ ] yes [ ] no  QOL impact -   Location -                    Aggravating factors -  Quality -  Radiation -  Timing-  Severity (0-10 scale):  Minimal acceptable level (0-10 scale):     PAIN AD Score: 1    [email or web address] (press ctrl +  left click to view)    Dyspnea:                           [ ]Mild [ ]Moderate [ ]Severe  Anxiety:                             [ ]Mild [ ]Moderate [ ]Severe  Fatigue:                             [ ]Mild [ ]Moderate [ ]Severe  Nausea:                             [ ]Mild [ ]Moderate [ ]Severe  Loss of appetite:              [ ]Mild [ ]Moderate [ ]Severe  Constipation:                    [ ]Mild [ ]Moderate [ ]Severe    Other Symptoms:  [ ]All other review of systems negative     Karnofsky Performance Score/Palliative Performance Status Version 2:         10%    http://npcrc.org/files/news/palliative_performance_scale_ppsv2.pdf  PHYSICAL EXAM:  Vital Signs Last 24 Hrs  T(C): 36.4 (2022 10:50), Max: 36.9 (2022 04:38)  T(F): 97.6 (2022 10:50), Max: 98.4 (2022 04:38)  HR: 84 (2022 10:50) (80 - 102)  BP: 116/64 (2022 10:50) (98/74 - 144/78)  BP(mean): --  RR: 17 (2022 10:50) (15 - 22)  SpO2: 95% (2022 10:50) (95% - 100%) I&O's Summary    2022 07:01  -  2022 13:52  --------------------------------------------------------  IN: 0 mL / OUT: 20 mL / NET: -20 mL    GENERAL:  [ ]Alert  [ ]Oriented x   [ ]Lethargic  [ ]Cachexia  [x ]Unarousable  [ ]Verbal  [x ]Non-Verbal  Behavioral:   [ ] Anxiety  [ ] Delirium [ ] Agitation [ ] Other  HEENT:  [ ]Normal   [x ]Dry mouth   [ ]ET Tube/Trach  [ ]Oral lesions  PULMONARY:   [ ]Clear [x]Tachypnea  [ ]Audible excessive secretions   [ ]Rhonchi        [ ]Right [ ]Left [ ]Bilateral  [ ]Crackles        [ ]Right [ ]Left [ ]Bilateral  [ ]Wheezing     [ ]Right [ ]Left [ ]Bilateral  diminished breath sounds bilaterally   CARDIOVASCULAR:    [x ]Regular [ ]Irregular [ ]Tachy  [ ]Sergio [ ]Murmur [ ]Other  GASTROINTESTINAL:  [ ]Soft  [ x]Distended   [ ]+BS  [ ]Non tender [ ]Tender  [ ]PEG [ ]OGT/ NGT  Last BM: prior to admission  GENITOURINARY:  [ ]Normal [ ] Incontinent   [ ]Oliguria/Anuria   [x ]Smith  MUSCULOSKELETAL:   [ ]Normal   [ ]Weakness  [ x]Bed/Wheelchair bound [x ]Edema left hand and bilateral lower extremities  NEUROLOGIC:   [ ]No focal deficits  [x ] Cognitive impairment  [ ] Dysphagia [ ]Dysarthria [ ] Paresis [ ]Other   SKIN:   [x ]Normal   [ ]Pressure ulcer(s)  [ ]Rash    CRITICAL CARE:  [ ] Shock Present  [ ]Septic [ ]Cardiogenic [ ]Neurologic [ ]Hypovolemic  [ ]  Vasopressors [ ]  Inotropes   [ ] Respiratory failure present [ ] mechanical ventilation [ ] non-invasive ventilatory support [ ] High flow  [ ] Acute  [ ] Chronic [ ] Hypoxic  [ ] Hypercarbic [ ] Other  [ ] Other organ failure     LABS:                        14.3   4.62  )-----------( 266      ( 2022 21:26 )             46.3   02-    135  |  102  |  71<H>  ----------------------------<  121<H>  5.0   |  25  |  3.02<H>    Ca    9.2      2022 22:03  Mg     3.0     -    TPro  7.3  /  Alb  2.7<L>  /  TBili  0.9  /  DBili  x   /  AST  34  /  ALT  20  /  AlkPhos  166<H>  02-13      Urinalysis Basic - ( 2022 10:39 )    Color: Yellow / Appearance: Clear / S.020 / pH: x  Gluc: x / Ketone: Trace  / Bili: Small / Urobili: 1 mg/dL   Blood: x / Protein: 100 mg/dL / Nitrite: Negative   Leuk Esterase: Small / RBC: 11-25 /HPF / WBC 11-25   Sq Epi: x / Non Sq Epi: Moderate / Bacteria: Moderate    Troponin I, High Sensitivity (22 @ 22:03)    Troponin I, High Sensitivity Result: 88.1: TYPE:(C=Critical, N=Notification, A=Abnormal) n  TESTS: troponin  DATE/TIME CALLED: 2022 22:32:20 EST  CALLED TO: _mary Silva  READ BACK (2 Patient Identifiers)(Y/N): y  READ BACK VALUES (Y/N): y  CALLED BY: antione  Serial measurements of high sensitivity troponin I (hs TnI) showing rapid  upward or downward changes suggest acute myocardial injury.  Please note  that a sustained elevation of hsTnI can be caused by renal disease,  chronic heart failure, sepsis, pulmonary embolism and other clinical  conditions. ng/L    Serum Pro-Brain Natriuretic Peptide (22 @ 22:03)    Serum Pro-Brain Natriuretic Peptide: 16741 pg/mL    RADIOLOGY & ADDITIONAL STUDIES:   < from: Xray Chest 1 View- PORTABLE-Urgent (22 @ 21:12) >  ACC: [id number] EXAM:  XR CHEST PORTABLE URGENT 1V                        PROCEDURE DATE:  2022    INTERPRETATION:  AP chest on 2022 at 8:55 PM. Patient has   chest pain and difficulty breathing.  COMPARISON: None available.  Heart is possibly enlarged.  There is a moderate to large right effusion somewhat loculated laterally.  There are central infiltrate somewhat hidden by the enlarged. Infiltrates   are somewhat scattered.  IMPRESSION: Probable heart enlargement. Right effusion and scattered   infiltrates as above.    --- End of Report ---  < end of copied text >    PROTEIN CALORIE MALNUTRITION PRESENT: [ x] Yes [ ] No  [x ] PPSV2 < or = to 30% [ ] significant weight loss  [ ] poor nutritional intake [ ] catabolic state [ ] anasarca     Artificial Nutrition [ ]     REFERRALS:   [ ]Chaplaincy  [ ] Hospice  [ ]Child Life  [ ]Social Work  [ ]Case management [ ]Holistic Therapy     Goals of Care Document:     HPI:  Patient is a 89F with a PMH of CHF (EF reportedly 30% last year), HTN, HLD, hypothyroidism who presents to the ED for dyspnea.    Patient currently AAOx2, unable to provide history.  Per ED staff who spoke to family members, patient has had increased dyspnea and lower extremity swelling.  No other complaints.  SpO2 currently 100% on 4L via NC.  Hypothermic in ED - 95.3, rectal.  Labs show elevated creatinine and elevated troponins.  Will admit to tele. (2022 00:39)    PERTINENT PM/SXH:   Congestive heart failure (CHF)    Diabetes    Hypothyroidism        FAMILY HISTORY:  FH: HTN (hypertension)      ITEMS NOT CHECKED ARE NOT PRESENT    SOCIAL HISTORY:   Significant other/partner:  [ ]  Children: yes, Jaz [ ]  Faith/Spirituality: Faith   Substance hx:  [ ]   Tobacco hx:  [ ]   Alcohol hx: [ ]   Home Opioid hx:  [x ] I-Stop Reference No: Reference #: [id number]  Living Situation: [ ]Home  [ ]Long term care  [ ]Rehab [ ]Other    ADVANCE DIRECTIVES:    DNR  MOLST  [ ]  Living Will  [ ]   DECISION MAKER(s):  [ ] Health Care Proxy(s)  [x ] Surrogate(s)  [ ] Guardian           Name(s): Phone Number(s): Jaz [phone]    BASELINE (I)ADL(s) (prior to admission):  [place]: [ ]Total  [ x] Moderate [ ]Dependent    Allergies    No Known Allergies    Intolerances    MEDICATIONS  (STANDING):  atorvastatin 10 milliGRAM(s) Oral at bedtime  dextrose 40% Gel 15 Gram(s) Oral once  dextrose 5%. 1000 milliLiter(s) (50 mL/Hr) IV Continuous <Continuous>  dextrose 5%. 1000 milliLiter(s) (100 mL/Hr) IV Continuous <Continuous>  dextrose 50% Injectable 25 Gram(s) IV Push once  dextrose 50% Injectable 12.5 Gram(s) IV Push once  dextrose 50% Injectable 25 Gram(s) IV Push once  furosemide   Injectable 40 milliGRAM(s) IV Push two times a day  glucagon  Injectable 1 milliGRAM(s) IntraMuscular once  heparin   Injectable 5000 Unit(s) SubCutaneous every 12 hours  insulin lispro (ADMELOG) corrective regimen sliding scale   SubCutaneous three times a day before meals  insulin lispro (ADMELOG) corrective regimen sliding scale   SubCutaneous three times a day before meals  levothyroxine 50 MICROGram(s) Oral daily  metoprolol tartrate 25 milliGRAM(s) Oral two times a day    MEDICATIONS  (PRN):    PRESENT SYMPTOMS: [x ]Unable to obtain due to poor mentation   Source if other than patient:  [ ]Family   [ ]Team     Pain: [ ] yes [ ] no  QOL impact -   Location -                    Aggravating factors -  Quality -  Radiation -  Timing-  Severity (0-10 scale):  Minimal acceptable level (0-10 scale):     PAIN AD Score: 1    [email or web address] (press ctrl +  left click to view)    Dyspnea:                           [ ]Mild [ ]Moderate [ ]Severe  Anxiety:                             [ ]Mild [ ]Moderate [ ]Severe  Fatigue:                             [ ]Mild [ ]Moderate [ ]Severe  Nausea:                             [ ]Mild [ ]Moderate [ ]Severe  Loss of appetite:              [ ]Mild [ ]Moderate [ ]Severe  Constipation:                    [ ]Mild [ ]Moderate [ ]Severe    Other Symptoms:  [ ]All other review of systems negative     Karnofsky Performance Score/Palliative Performance Status Version 2:         10%    http://npcrc.org/files/news/palliative_performance_scale_ppsv2.pdf  PHYSICAL EXAM:  Vital Signs Last 24 Hrs  T(C): 36.4 (2022 10:50), Max: 36.9 (2022 04:38)  T(F): 97.6 (2022 10:50), Max: 98.4 (2022 04:38)  HR: 84 (2022 10:50) (80 - 102)  BP: 116/64 (2022 10:50) (98/74 - 144/78)  BP(mean): --  RR: 17 (2022 10:50) (15 - 22)  SpO2: 95% (2022 10:50) (95% - 100%) I&O's Summary    2022 07:01  -  2022 13:52  --------------------------------------------------------  IN: 0 mL / OUT: 20 mL / NET: -20 mL    GENERAL:  [ ]Alert  [ ]Oriented x   [ ]Lethargic  [ ]Cachexia  [x ]Unarousable  [ ]Verbal  [x ]Non-Verbal  Behavioral:   [ ] Anxiety  [ ] Delirium [ ] Agitation [ ] Other  HEENT:  [ ]Normal   [x ]Dry mouth   [ ]ET Tube/Trach  [ ]Oral lesions  PULMONARY:   [ ]Clear [x]Tachypnea  [ ]Audible excessive secretions   [ ]Rhonchi        [ ]Right [ ]Left [ ]Bilateral  [ ]Crackles        [ ]Right [ ]Left [ ]Bilateral  [ ]Wheezing     [ ]Right [ ]Left [ ]Bilateral  diminished breath sounds bilaterally   CARDIOVASCULAR:    [x ]Regular [ ]Irregular [ ]Tachy  [ ]Sergio [ ]Murmur [ ]Other  GASTROINTESTINAL:  [ ]Soft  [ x]Distended   [ ]+BS  [ ]Non tender [ ]Tender  [ ]PEG [ ]OGT/ NGT  Last BM: prior to admission  GENITOURINARY:  [ ]Normal [ ] Incontinent   [ ]Oliguria/Anuria   [x ]Smith  MUSCULOSKELETAL:   [ ]Normal   [ ]Weakness  [ x]Bed/Wheelchair bound [x ]Edema left hand and bilateral lower extremities  NEUROLOGIC:   [ ]No focal deficits  [x ] Cognitive impairment  [ ] Dysphagia [ ]Dysarthria [ ] Paresis [ ]Other   SKIN:   [x ]Normal   [ ]Pressure ulcer(s)  [ ]Rash    CRITICAL CARE:  [ ] Shock Present  [ ]Septic [ ]Cardiogenic [ ]Neurologic [ ]Hypovolemic  [ ]  Vasopressors [ ]  Inotropes   [ ] Respiratory failure present [ ] mechanical ventilation [ ] non-invasive ventilatory support [ ] High flow  [ ] Acute  [ ] Chronic [ ] Hypoxic  [ ] Hypercarbic [ ] Other  [ ] Other organ failure     LABS:                        14.3   4.62  )-----------( 266      ( 2022 21:26 )             46.3   02-13    135  |  102  |  71<H>  ----------------------------<  121<H>  5.0   |  25  |  3.02<H>    Ca    9.2      2022 22:03  Mg     3.0     02-13    TPro  7.3  /  Alb  2.7<L>  /  TBili  0.9  /  DBili  x   /  AST  34  /  ALT  20  /  AlkPhos  166<H>        Urinalysis Basic - ( 2022 10:39 )    Color: Yellow / Appearance: Clear / S.020 / pH: x  Gluc: x / Ketone: Trace  / Bili: Small / Urobili: 1 mg/dL   Blood: x / Protein: 100 mg/dL / Nitrite: Negative   Leuk Esterase: Small / RBC: 11-25 /HPF / WBC 11-25   Sq Epi: x / Non Sq Epi: Moderate / Bacteria: Moderate    Troponin I, High Sensitivity (22 @ 22:03)    Troponin I, High Sensitivity Result: 88.1: TYPE:(C=Critical, N=Notification, A=Abnormal) n  TESTS: troponin  DATE/TIME CALLED: 2022 22:32:20 EST  CALLED TO: _mary Silva  READ BACK (2 Patient Identifiers)(Y/N): y  READ BACK VALUES (Y/N): y  CALLED BY: antione  Serial measurements of high sensitivity troponin I (hs TnI) showing rapid  upward or downward changes suggest acute myocardial injury.  Please note  that a sustained elevation of hsTnI can be caused by renal disease,  chronic heart failure, sepsis, pulmonary embolism and other clinical  conditions. ng/L    Serum Pro-Brain Natriuretic Peptide (22 @ 22:03)    Serum Pro-Brain Natriuretic Peptide: 75173 pg/mL    RADIOLOGY & ADDITIONAL STUDIES:   < from: Xray Chest 1 View- PORTABLE-Urgent (22 @ 21:12) >  ACC: [id number] EXAM:  XR CHEST PORTABLE URGENT 1V                        PROCEDURE DATE:  2022    INTERPRETATION:  AP chest on 2022 at 8:55 PM. Patient has   chest pain and difficulty breathing.  COMPARISON: None available.  Heart is possibly enlarged.  There is a moderate to large right effusion somewhat loculated laterally.  There are central infiltrate somewhat hidden by the enlarged. Infiltrates   are somewhat scattered.  IMPRESSION: Probable heart enlargement. Right effusion and scattered   infiltrates as above.    --- End of Report ---  < end of copied text >    < from: TTE Echo Complete w/o Contrast w/ Doppler (22 @ 12:30) >   EXAM:  ECHO TTE WO CON COMP W DOPP    PROCEDURE DATE:  2022    INTERPRETATION:  TRANSTHORACIC ECHOCARDIOGRAM REPORT  Patient Name:   GANESH MOISE Patient Location: I  Medical Rec #:  [id number]       Accession #:      [id number]  Account #:                       Height:           65.7 in 167.0 cm  YOB: 1922        Weight:           198.4 lb 90.00 kg  Patient Age:    89 years         BSA:              1.99 m²  Patient Gender: F                BP:    116/64 mmHg  Date of Exam:        2022 12:30:01 PM  Sonographer:         JUSTIN  Referring Physician: SENDY LUNA  Procedure:     2D Echo/Doppler/Color Doppler Complete.  Indications:   Heart failure, unspecified - I50.9  Diagnosis:  Heart failure, unspecified - I50.9  Study Details: Technically adequate study.  2D AND M-MODE MEASUREMENTS (normal ranges within parentheses):  Left                 Normal   Aorta/Left            Normal  Ventricle:                    Atrium:  IVSd (2D):    1.19  (0.7-1.1) Aortic Root    2.67  (2.4-3.7)                 cm             (2D):           cm  LVPWd (2D):   1.29  (0.7-1.1) AoV Cusp       0.78  (1.5-2.6)                 cm             Separation:     cm  LVIDd (2D):   3.20  (3.4-5.7) Left Atrium    3.49  (1.9-4.0)                 cm             (2D):           cm  LVIDs (2D):   2.03            LA Vol Index   41.1                 cm             (A4C):        ml/m²  LV FS (2D):   36.6   (>25%)   LA Vol Index   29.9     %             (A2C):        ml/m²  Relative Wall 0.81   (<0.42)  LA Vol Index   38.7  Thickness                     (BP):         ml/m²                                Right                                Ventricle:                                TAPSE:          1.15 cm    LV DIASTOLIC FUNCTION:  Lateral e' 0.1 m/s Decel Time: 190 msec  SPECTRAL DOPPLER ANALYSIS (where applicable):  Mitral Valve:  MV P1/2 Time: 55.06 msec  MV Area, PHT: 4.00 cm²  Aortic Valve: AoV Max Eliu: 1.45 m/s AoV PeakP.4 mmHg AoV Mean PG:   3.2 mmHg  LVOT Vmax: 0.56 m/s LVOT VTI: 0.109 m LVOT Diameter: 1.89 cm  AoV Area, Vmax: 1.08 cm² AoV Area, VTI: 1.33 cm² AoV Area, Vmn: 1.16 cm²  Tricuspid Valve and PA/RV Systolic Pressure: TR Max Velocity: 2.62 m/Yao   Pressure: 15 mmHg RVSP/PASP: 42.5 mmHg  PHYSICIAN INTERPRETATION:  Left Ventricle: The left ventricular internal cavity size is normal. Left   ventricular wall thickness is normal.  Global LV systolic function was severely decreased. Left ventricular   ejection fraction, by visual estimation, is 30 to 35%. The   interventricular septum is flattened in diastole ('D' shaped left   ventricle), consistent with right ventricular volume overload. The left   ventricular diastolic function couldnot be assessed in this study.  LV Wall Scoring:  The basal and mid inferior wall, basal and mid inferolateral wall, mid  inferoseptal segment, and mid anterolateral segment are akinetic.  Right Ventricle: The right ventricular size is moderately enlarged. RV   wall thickness is mildly increased. RV systolic function is normal.   Findings are consistent with pulmonary hypertension.  Left Atrium: Mildly enlarged left atrium. Atrial septum bowing into left   atrium due to increased RA pressure.  Right Atrium: The right atrial pressure is moderately elevated. Severely   enlarged right atrium.  Pericardium: There is no evidence of pericardial effusion.  Mitral Valve: Structurally normal mitral valve, with normal leaflet   excursion. No evidence of mitral valve regurgitation is seen.  Tricuspid Valve: Structurally normal tricuspid valve, with normal leaflet   excursion. Severe tricuspid regurgitation is visualized. Estimated   pulmonary artery systolic pressure is 42.5 mmHg assuming a right atrial   pressure of 15 mmHg, which is consistent with mild pulmonary hypertension.  Aortic Valve: Sclerotic aortic valve with decreased opening. Peak   transaortic gradient equals 8.4 mmHg, mean transaortic gradient equals   3.2 mmHg, the calculated aortic valve area equals 1.33 cm² by the   continuity equation consistent with moderate aortic stenosis. The peak   aortic velocity was obtained from the apical view. No evidence of aortic   valve regurgitation is seen.  Pulmonic Valve: Structurally normal pulmonic valve, with normal leaflet   excursion. Mild pulmonic valve regurgitation.  Aorta: The aortic root and ascending aorta are structurally normal, with   no evidence of dilitation.  Pulmonary Artery: The main pulmonary artery is normalin size.  Venous: The inferior vena cava was dilated, with respiratory size   variation less than 50%, consistent with elevated right atrial pressure.    Summary:   1. Left ventricular ejection fraction, by visual estimation, is 30 to   35%.   2. Severely decreased global left ventricular systolic function.   3. Severely enlarged right atrium.   4. The left ventricular diastolic function could not be assessed in this   study.   5. There is moderate concentric left ventricular hypertrophy.   6. Mildly increased RV wall thickness.   7. Moderately enlarged right ventricle.   8. Mildly enlarged left atrium.   9. No evidence of mitral valve regurgitation.  10. Severe tricuspid regurgitation.  11. Sclerotic aortic valve with decreased opening.  12. Mild pulmonic valve regurgitation.  13. Estimated pulmonary artery systolic pressure is 42.5 mmHg assuming a   right atrial pressure of 15 mmHg, which is consistent with mild pulmonary   hypertension.  14. Peak transaortic gradient equals 8.4 mmHg,mean transaortic gradient   equals 3.2 mmHg, the calculated aortic valve area equals 1.33 cm² by the   continuity equation consistent with moderate aortic stenosis.  15. Basal and mid inferior wall, basal and mid inferolateral wall, mid   inferoseptal segment, and mid anterolateral segment are abnormal as   described above.      [id number] Srinivasan Andrew MD, FACC  Electronically signed on 2022 at 2:45:01 PM            *** Final ***                  < end of copied text >      PROTEIN CALORIE MALNUTRITION PRESENT: [ x] Yes [ ] No  [x ] PPSV2 < or = to 30% [ ] significant weight loss  [ ] poor nutritional intake [ ] catabolic state [ ] anasarca     Artificial Nutrition [ ]     REFERRALS:   [ ]Chaplaincy  [ ] Hospice  [ ]Child Life  [ ]Social Work  [ ]Case management [ ]Holistic Therapy     Goals of Care Document:

## 2022-02-14 NOTE — CONSULT NOTE ADULT - PROBLEM SELECTOR RECOMMENDATION 9
was on NRB moved to NC  abdominal accessory muscle use noted on exam CXR: right effusion and scattered infiltrates  was on NRB moved to NC  abdominal accessory muscle use noted on exam  abdominal distention CXR: right effusion and scattered infiltrates  was on NRB moved to NC  abdominal accessory muscle use noted on exam  abdominal distention  could consider low dose opioids for work of breathing

## 2022-02-14 NOTE — H&P ADULT - PROBLEM SELECTOR PLAN 1
pulm edema on CXR, bilateral leg swelling  Lasix IV 40 bid.  Cardio eval in am   Will order TTE (reported EF last 30%)  Smith placement

## 2022-02-14 NOTE — H&P ADULT - HISTORY OF PRESENT ILLNESS
Patient is a 89F with a PMH of CHF (EF reportedly 30% last year), HTN, HLD, hypothyroidism who presents to the ED for dyspnea.    Patient currently AAOx2, unable to provide history.  Per ED staff who spoke to family members, patient has had increased dyspnea and lower extremity swelling.  No other complaints.  SpO2 currently 100% on 4L via NC.  Hypothermic in ED - 95.3, rectal.  Labs show elevated creatinine and elevated troponins.  Will admit to tele.

## 2022-02-14 NOTE — PROVIDER CONTACT NOTE (OTHER) - BACKGROUND
Admitted for CHF Exacerbation, elevated Troponin 88.1, Elevated D-dimer 2721, elevated BNP, Generalized edema.

## 2022-02-14 NOTE — CONSULT NOTE ADULT - SUBJECTIVE AND OBJECTIVE BOX
CARDIOLOGY CONSULTATION NOTE                                                                             GANESH MOISE is a 89y Female with a PMH of CHF (EF reportedly 30% last year), HTN, HLD, hypothyroidism who presents to the ED for dyspnea.   Patient currently AAOx1, unable to provide history.  Per ED staff who spoke to family members, patient has had increased dyspnea and lower extremity swelling.  No other complaints.  SpO2 currently 100% on 4L via NC.  Hypothermic in ED - 95.3, rectal.  Labs show elevated creatinine and elevated troponins.    REVIEW OF SYSTEMS: NA -----------------------------    Home Medications: amLODIPine:  (13 Feb 2022 21:19) atorvastatin:  (13 Feb 2022 21:19) furosemide 40 mg oral tablet: 1 tab(s) orally once a day (13 Feb 2022 21:19) levothyroxine:  (13 Feb 2022 21:19) metoprolol:  (13 Feb 2022 21:19)   MEDICATIONS  (STANDING): atorvastatin 10 milliGRAM(s) Oral at bedtime furosemide   Injectable 40 milliGRAM(s) IV Push two times a day glucagon  Injectable 1 milliGRAM(s) IntraMuscular once heparin   Injectable 5000 Unit(s) SubCutaneous every 12 hours insulin lispro (ADMELOG) corrective regimen sliding scale   SubCutaneous three times a day before meals insulin lispro (ADMELOG) corrective regimen sliding scale   SubCutaneous three times a day before meals levothyroxine 50 MICROGram(s) Oral daily metoprolol tartrate 25 milliGRAM(s) Oral two times a day   ALLERGIES: No Known Allergies   FAMILY HISTORY: FH: HTN (hypertension)    PHYSICAL EXAMINATION: ----------------------------- T(C): 36.4 (02-14-22 @ 10:50), Max: 36.9 (02-14-22 @ 04:38) HR: 84 (02-14-22 @ 10:50) (80 - 102) BP: 116/64 (02-14-22 @ 10:50) (98/74 - 144/78) RR: 17 (02-14-22 @ 10:50) (15 - 22) SpO2: 95% (02-14-22 @ 10:50) (95% - 100%) Wt(kg): --  02-14 @ 07:01  -  02-14 @ 12:08 -------------------------------------------------------- IN: Total IN: 0 mL  OUT:   Voided (mL): 20 mL Total OUT: 20 mL  Total NET: -20 mL    Height (cm): 167.6 (02-13 @ 20:39) Weight (kg): 89.9 (02-14 @ 04:38) BMI (kg/m2): 32 (02-14 @ 04:38) BSA (m2): 1.99 (02-14 @ 04:38)  Constitutional: well developed, normal appearance, well groomed, well nourished, no deformities and no acute distress.  Eyes: the conjunctiva exhibited no abnormalities and the eyelids demonstrated no xanthelasmas.  HEENT: normal oral mucosa, no oral pallor and no oral cyanosis.  Neck: normal jugular venous A waves present, normal jugular venous V waves present and no jugular venous rosario A waves.  Pulmonary: no respiratory distress, normal respiratory rhythm and effort, no accessory muscle use and lungs were clear to auscultation bilaterally.  Cardiovascular: heart rate and rhythm were normal, normal S1 and S2 and no murmur, gallop, rub, heave or thrill are present. 2+ b/l pitting edema up to thighs. Chronic stasis changes Abdomen: soft, non-tender, no hepato-splenomegaly and no abdominal mass palpated.  Musculoskeletal: the gait could not be assessed..  Extremities: no clubbing of the fingernails, no localized cyanosis, no petechial hemorrhages and no ischemic changes.  Skin: No rash, no venous stasis, no skin lesions, no skin ulcer and no xanthoma was observed.  Psychiatric: oriented to person, place, and time, the affect was normal, the mood was normal and not feeling anxious.   ECG: ------- < from: 12 Lead ECG (02.13.22 @ 20:58) >  Ventricular Rate 100 BPM  Atrial Rate 125 BPM  QRS Duration 74 ms  Q-T Interval 320 ms  QTC Calculation(Bazett) 412 ms  R Axis -33 degrees  T Axis 115 degrees  Diagnosis Line Atrial fibrillation with premature ventricular or aberrantly conducted complexes Left axis deviation Low voltage QRS Inferior infarct , age undetermined Cannot rule out Anteroseptal infarct , age undetermined Abnormal ECG No previous ECGs available Confirmed by Srinivasan Andrew MD ([id number]) on 2/14/2022 11:24:46 AM  < end of copied text >    LABS:  -------- 02-13  135  |  102  |  71<H> ----------------------------<  121<H> 5.0   |  25  |  3.02<H>  Ca    9.2      13 Feb 2022 22:03 Mg     3.0     02-13  TPro  7.3  /  Alb  2.7<L>  /  TBili  0.9  /  DBili  x   /  AST  34  /  ALT  20  /  AlkPhos  166<H>  02-13                       14.3  4.62  )-----------( 266      ( 13 Feb 2022 21:26 )            46.3     02-13 @ 22:03 BNP: 85291 pg/mL  Troponin I, High Sensitivity Result: 88.1: (02.13.22 @ 22:03)      RADIOLOGY REPORTS: -----------------------------  < from: Xray Chest 1 View- PORTABLE-Urgent (02.13.22 @ 21:12) >  ACC: [id number] EXAM:  XR CHEST PORTABLE URGENT 1V                        PROCEDURE DATE:  02/13/2022      INTERPRETATION:  AP chest on February 13, 2022 at 8:55 PM. Patient has  chest pain and difficulty breathing.  COMPARISON: None available.  Heart is possibly enlarged.  There is a moderate to large right effusion somewhat loculated laterally.  There are central infiltrate somewhat hidden by the enlarged. Infiltrates  are somewhat scattered.  IMPRESSION: Probable heart enlargement. Right effusion and scattered  infiltrates as above.  --- End of Report ---      NAHOMY LIRA MD; Attending Radiologist This document has been electronically signed. Feb 14 2022 10:38AM  < end of copied text >   ECHOCARDIOGRAM: --------------------------- pending

## 2022-02-14 NOTE — CONSULT NOTE ADULT - PROBLEM SELECTOR RECOMMENDATION 4
unarousable on exam  per daughter is normally able to get around with some assistance and is alert, conversant and able to feed herself

## 2022-02-14 NOTE — CONSULT NOTE ADULT - PROBLEM SELECTOR RECOMMENDATION 7
Spoke with patient's daughter, Jaz [phone] who said patient has been living with her and she was able to get up and around with assistance and was feeding herself, conversant and would watch TV.  Recently, she has had more fatigue and activity intolerance.  Asked Jaz if her mother had any advanced directives or discussed goals of care.  She had not had any prior conversations about those things.  She is planning to visit today and daughter to continue to think about GOC.

## 2022-02-14 NOTE — PROVIDER CONTACT NOTE (OTHER) - ASSESSMENT
Patient is lethargic, only awakes to tactile stimulation.    Bladder scan shows 1398ml. Abdomen distended. Smith in place and not draining urine.

## 2022-02-14 NOTE — H&P ADULT - ASSESSMENT
Patient is a 89F with a PMH of CHF (EF reportedly 30% last year), HTN, HLD, hypothyroidism who presents to the ED for dyspnea.  Patient currently AAOx2, unable to provide history.  Per ED staff who spoke to family members, patient has had increased dyspnea and lower extremity swellig.  No other complaints.  SpO2 currently 100% on 4L via NC.  Hypothermic in ED - 95.3, rectal.  Labs show elevated creatinine and elevated troponins.  Will admit to tele.    IMPROVE VTE Individual Risk Assessment          RISK                                                          Points  [  ] Previous VTE                                                3  [  ] Thrombophilia                                             2  [  ] Lower limb paralysis                                    2        (unable to hold up >15 seconds)    [  ] Current Cancer                                             2         (within 6 months)  [  ] Immobilization > 24 hrs                              1  [  ] ICU/CCU stay > 24 hours                            1  [  ] Age > 60                                                    1    IMPROVE VTE Score - 1

## 2022-02-14 NOTE — ED ADULT NURSE REASSESSMENT NOTE - NS ED NURSE REASSESS COMMENT FT1
Butler catheter ordered. Multiples attempts to insert butler were unsuccessful due to patient's vaginal anatomy and generalized edema. PA notified and order for bladder scan received. Bladder scan performed and displayed 1250ml of urine. PA notified of finding. She will assess patient at the transferring floor at room 260 D. Receiving nurse called and updated.

## 2022-02-14 NOTE — PROVIDER CONTACT NOTE (OTHER) - ACTION/TREATMENT ORDERED:
Per CHEYANNE Robin ordered Not to change butler due to difficulty of previous insertion. Warming blanket ordered.

## 2022-02-14 NOTE — CONSULT NOTE ADULT - PROBLEM SELECTOR RECOMMENDATION 2
pending results of echo  BNP 62633 and lower extremity swelling and abdominal swelling   lasix 40mg IVP 2 times per day Echo: EF 30-35%, Severely decreased global left ventricular systolic function, LVH and enlarged right atrium and ventricle  BNP 94857 and lower extremity swelling and abdominal swelling   lasix 40mg IVP 2 times per day  more fatigue with exertion at home recently per daughter

## 2022-02-14 NOTE — CONSULT NOTE ADULT - PROBLEM SELECTOR RECOMMENDATION 3
elevated BUN/creatinine   likely cardio renal issue elevated BUN/creatinine   likely cardio renal issue  on lasix 40mg IVP 2 times per day  butler in place

## 2022-02-14 NOTE — H&P ADULT - NSHPLABSRESULTS_GEN_ALL_CORE
Recent Vitals  T(C): 35.2 (02-13-22 @ 22:51), Max: 35.2 (02-13-22 @ 22:51)  HR: 99 (02-13-22 @ 23:32) (80 - 99)  BP: 102/73 (02-13-22 @ 23:32) (98/74 - 144/78)  RR: 15 (02-13-22 @ 23:32) (15 - 22)  SpO2: 97% (02-13-22 @ 23:32) (97% - 100%)                        14.3   4.62  )-----------( 266      ( 13 Feb 2022 21:26 )             46.3     02-13    135  |  102  |  71<H>  ----------------------------<  121<H>  5.0   |  25  |  3.02<H>    Ca    9.2      13 Feb 2022 22:03  Mg     3.0     02-13    TPro  7.3  /  Alb  2.7<L>  /  TBili  0.9  /  DBili  x   /  AST  34  /  ALT  20  /  AlkPhos  166<H>  02-13      LIVER FUNCTIONS - ( 13 Feb 2022 22:03 )  Alb: 2.7 g/dL / Pro: 7.3 gm/dL / ALK PHOS: 166 U/L / ALT: 20 U/L / AST: 34 U/L / GGT: x               Home Medications:  amLODIPine:  (13 Feb 2022 21:19)  atorvastatin:  (13 Feb 2022 21:19)  furosemide 40 mg oral tablet: 1 tab(s) orally once a day (13 Feb 2022 21:19)  levothyroxine:  (13 Feb 2022 21:19)  metoprolol:  (13 Feb 2022 21:19)

## 2022-02-14 NOTE — CONSULT NOTE ADULT - ATTENDING COMMENTS
Pt seen and examined with MIKE Langley, agree with above assessment and plan - note adjusted where needed/ pt is 89y.o female with severe LV dysfunction, MARINA with poor mentation and overt volume overload not that responsive to diuresis. NP Korin spoke with pt's daughter to assess care goals, daughter admits this is an acute change in her mother and was tearful on phone, she is unaware of her mother's wishes and will come to visit today to try to come to decision. Overall prognosis limited based on poor cardiac function advanced age and poor mentation. Pt not likely to benefit from ACLS in the event she suffers an arrest.   D/w Dr. Mcclellan

## 2022-02-15 PROBLEM — I25.10 ATHEROSCLEROTIC HEART DISEASE OF NATIVE CORONARY ARTERY WITHOUT ANGINA PECTORIS: Chronic | Status: ACTIVE | Noted: 2018-06-14

## 2022-02-15 PROBLEM — E11.9 TYPE 2 DIABETES MELLITUS WITHOUT COMPLICATIONS: Chronic | Status: ACTIVE | Noted: 2018-06-14

## 2022-02-15 PROBLEM — E78.5 HYPERLIPIDEMIA, UNSPECIFIED: Chronic | Status: ACTIVE | Noted: 2018-06-14

## 2022-02-15 PROBLEM — I10 ESSENTIAL (PRIMARY) HYPERTENSION: Chronic | Status: ACTIVE | Noted: 2018-06-14

## 2022-02-15 PROBLEM — E03.9 HYPOTHYROIDISM, UNSPECIFIED: Chronic | Status: ACTIVE | Noted: 2018-06-14

## 2022-02-15 NOTE — CONSULT NOTE ADULT - SUBJECTIVE AND OBJECTIVE BOX
HPI:  Patient is a 89F with a PMH of CHF (EF reportedly 30% last year), HTN, HLD, hypothyroidism who presents to the ED for dyspnea.    Patient currently AAOx2, unable to provide history.  Per ED staff who spoke to family members, patient has had increased dyspnea and lower extremity swelling.  No other complaints.  SpO2 currently 100% on 4L via NC.  Hypothermic in ED - 95.3, rectal.  Labs show elevated creatinine and elevated troponins.  Will admit to tele. (2022 00:39)  ------ As Above -------  Patient is a poor historian ( Not communicating ) Consult      PAST MEDICAL & SURGICAL HISTORY:  Congestive heart failure (CHF)    Diabetes    Hypothyroidism        MEDICATIONS  (STANDING):  atorvastatin 10 milliGRAM(s) Oral at bedtime  dextrose 40% Gel 15 Gram(s) Oral once  dextrose 5%. 1000 milliLiter(s) (50 mL/Hr) IV Continuous <Continuous>  dextrose 5%. 1000 milliLiter(s) (100 mL/Hr) IV Continuous <Continuous>  dextrose 50% Injectable 25 Gram(s) IV Push once  dextrose 50% Injectable 12.5 Gram(s) IV Push once  dextrose 50% Injectable 25 Gram(s) IV Push once  furosemide   Injectable 40 milliGRAM(s) IV Push two times a day  glucagon  Injectable 1 milliGRAM(s) IntraMuscular once  influenza  Vaccine (HIGH DOSE) 0.7 milliLiter(s) IntraMuscular once  insulin lispro (ADMELOG) corrective regimen sliding scale   SubCutaneous three times a day before meals  insulin lispro (ADMELOG) corrective regimen sliding scale   SubCutaneous three times a day before meals  lactated ringers. 1000 milliLiter(s) (50 mL/Hr) IV Continuous <Continuous>  levothyroxine 50 MICROGram(s) Oral daily  metoprolol tartrate 25 milliGRAM(s) Oral two times a day    MEDICATIONS  (PRN):      Allergies    No Known Allergies    Intolerances        FAMILY HISTORY:  FH: HTN (hypertension)        REVIEW OF SYSTEMS:    CONSTITUTIONAL: No fever, weight loss, or fatigue  EYES: No eye pain, visual disturbances, or discharge  ENMT:  No difficulty hearing, tinnitus, vertigo; No sinus or throat pain  NECK: No pain or stiffness  BREASTS: No pain, masses, or nipple discharge  RESPIRATORY: No cough, wheezing, chills or hemoptysis; No shortness of breath  CARDIOVASCULAR: No chest pain, palpitations, dizziness, or leg swelling  GASTROINTESTINAL: No abdominal or epigastric pain. No nausea, vomiting, or hematemesis; No diarrhea or constipation. No melena or hematochezia.  GENITOURINARY: No dysuria, frequency, hematuria, or incontinence  NEUROLOGICAL: No headaches, memory loss, loss of strength, numbness, or tremors  SKIN: No itching, burning, rashes, or lesions   LYMPH NODES: No enlarged glands  ENDOCRINE: No heat or cold intolerance; No hair loss  MUSCULOSKELETAL: No joint pain or swelling; No muscle, back, or extremity pain  PSYCHIATRIC: No depression, anxiety, mood swings, or difficulty sleeping  HEME/LYMPH: No easy bruising, or bleeding gums  ALLERGY AND IMMUNOLOGIC: No hives or eczema          SOCIAL HISTORY:    FAMILY HISTORY:  FH: HTN (hypertension)        Vital Signs Last 24 Hrs  T(C): 36.6 (15 Feb 2022 10:30), Max: 36.6 (2022 15:20)  T(F): 97.9 (15 Feb 2022 10:30), Max: 97.9 (15 Feb 2022 10:30)  HR: 101 (15 Feb 2022 10:30) (83 - 108)  BP: 116/80 (15 Feb 2022 10:30) (97/74 - 118/80)  BP(mean): --  RR: 17 (15 Feb 2022 10:30) (16 - 18)  SpO2: 93% (15 Feb 2022 10:30) (92% - 100%)    PHYSICAL EXAM:    GENERAL: NAD, well-groomed, well-developed  HEAD:  Atraumatic, Normocephalic  EYES: EOMI, PERRLA, conjunctiva and sclera clear  ENMT: No tonsillar erythema, exudates, or enlargement; Moist mucous membranes, Good dentition, No lesions  NECK: Supple, No JVD, Normal thyroid  NERVOUS SYSTEM:  Alert & Oriented X3, Good concentration; Motor Strength 5/5 B/L upper and lower extremities; DTRs 2+ intact and symmetric  CHEST/LUNG: Clear to percussion bilaterally; No rales, rhonchi, wheezing, or rubs  HEART: Regular rate and rhythm; No murmurs, rubs, or gallops  ABDOMEN: Soft, Nontender, Nondistended; Bowel sounds present  EXTREMITIES:  2+ Peripheral Pulses, No clubbing, cyanosis, or edema  LYMPH: No lymphadenopathy noted   RECTAL: No masses, prostate normal size and smooth, Guaiaci negative   BREAST: No palpable masses, skin no lesions no retractions, no discharges. adnexal no palpable masses noted   GYN: uterus normal size, adnexal, no palpable masses, no CMT, no uterine discharge   SKIN: No rashes or lesions    LABS:                        14.0   7.55  )-----------( 254      ( 15 Feb 2022 08:25 )             45.8       CBC:  02-15 @ 08:25  WBC  7.55  HGB 14.0  HCT 45.8 Plate 254  MCV 96.6   @ 21:26  WBC  4.62  HGB 14.3  HCT 46.3 Plate 266  MCV 94.7           15 Feb 2022 12:05    135    |  103    |  81     ----------------------------<  69     5.8     |  23     |  3.63   2022 22:03    135    |  102    |  71     ----------------------------<  121    5.0     |  25     |  3.02     Ca    9.5        15 Feb 2022 12:05  Ca    9.2        2022 22:03  Phos  6.6       15 Feb 2022 12:05  Mg     2.5       15 Feb 2022 12:05  Mg     3.0       2022 22:03    TPro  7.1    /  Alb  2.7    /  TBili  1.3    /  DBili  x      /  AST  40     /  ALT  23     /  AlkPhos  153    15 Feb 2022 12:05  TPro  7.3    /  Alb  2.7    /  TBili  0.9    /  DBili  x      /  AST  34     /  ALT  20     /  AlkPhos  166    2022 22:03    PT/INR - ( 15 Feb 2022 12:05 )   PT: 15.0 sec;   INR: 1.31 ratio           Urinalysis Basic - ( 2022 10:39 )    Color: Yellow / Appearance: Clear / S.020 / pH: x  Gluc: x / Ketone: Trace  / Bili: Small / Urobili: 1 mg/dL   Blood: x / Protein: 100 mg/dL / Nitrite: Negative   Leuk Esterase: Small / RBC: 11-25 /HPF / WBC 11-25   Sq Epi: x / Non Sq Epi: Moderate / Bacteria: Moderate          RADIOLOGY & ADDITIONAL STUDIES: HPI:  Patient is a 89F with a PMH of CHF (EF reportedly 30% last year), HTN, HLD, hypothyroidism who presents to the ED for dyspnea.    Patient currently AAOx2, unable to provide history.  Per ED staff who spoke to family members, patient has had increased dyspnea and lower extremity swelling.  No other complaints.  SpO2 currently 100% on 4L via NC.  Hypothermic in ED - 95.3, rectal.  Labs show elevated creatinine and elevated troponins.  Will admit to tele. (2022 00:39)  ------ As Above ------- History obtained from chart, MD and daughter  Patient is a poor historian ( Not communicating ) Consult called for new onset ascites by ultrasound.   Patient has no history of ascites, liver disease, hepatitis, ETOH abuse, new medications or family history liver disease.   Recent history of nausea, vague abdominal pain, bleeding hemorrhoids and urination --> BM  Last colonoscopy was ~ 9 years ago : Polyps / Diverticulum      PAST MEDICAL & SURGICAL HISTORY:  Congestive heart failure (CHF)    Diabetes    Hypothyroidism        MEDICATIONS  (STANDING):  atorvastatin 10 milliGRAM(s) Oral at bedtime  dextrose 40% Gel 15 Gram(s) Oral once  dextrose 5%. 1000 milliLiter(s) (50 mL/Hr) IV Continuous <Continuous>  dextrose 5%. 1000 milliLiter(s) (100 mL/Hr) IV Continuous <Continuous>  dextrose 50% Injectable 25 Gram(s) IV Push once  dextrose 50% Injectable 12.5 Gram(s) IV Push once  dextrose 50% Injectable 25 Gram(s) IV Push once  furosemide   Injectable 40 milliGRAM(s) IV Push two times a day  glucagon  Injectable 1 milliGRAM(s) IntraMuscular once  influenza  Vaccine (HIGH DOSE) 0.7 milliLiter(s) IntraMuscular once  insulin lispro (ADMELOG) corrective regimen sliding scale   SubCutaneous three times a day before meals  insulin lispro (ADMELOG) corrective regimen sliding scale   SubCutaneous three times a day before meals  lactated ringers. 1000 milliLiter(s) (50 mL/Hr) IV Continuous <Continuous>  levothyroxine 50 MICROGram(s) Oral daily  metoprolol tartrate 25 milliGRAM(s) Oral two times a day    MEDICATIONS  (PRN):      Allergies    No Known Allergies    Intolerances        FAMILY HISTORY:  FH: HTN (hypertension)        REVIEW OF SYSTEMS: Uncommunicative     CONSTITUTIONAL: No fever, weight loss  EYES: No eye pain, visual disturbances, or discharge  ENMT:  No difficulty hearing, tinnitus, vertigo; No sinus or throat pain  NECK: No pain or stiffness  BREASTS: No pain, masses, or nipple discharge  RESPIRATORY: No cough, wheezing, chills or hemoptysis; No shortness of breath  CARDIOVASCULAR: No chest pain, palpitations, dizziness, or leg swelling  GASTROINTESTINAL: See above   GENITOURINARY: No dysuria, frequency, hematuria, or incontinence  NEUROLOGICAL: No headaches,  numbness, or tremors  SKIN: No itching, burning, rashes, or lesions   LYMPH NODES: No enlarged glands  ENDOCRINE: No heat or cold intolerance; No hair loss  MUSCULOSKELETAL: No joint pain or swelling; No muscle, back, or extremity pain  PSYCHIATRIC: No depression, anxiety, mood swings, or difficulty sleeping  HEME/LYMPH: No easy bruising, or bleeding gums  ALLERGY AND IMMUNOLOGIC: No hives or eczema          SOCIAL HISTORY:    FAMILY HISTORY:  FH: HTN (hypertension)        Vital Signs Last 24 Hrs  T(C): 36.6 (15 Feb 2022 10:30), Max: 36.6 (2022 15:20)  T(F): 97.9 (15 Feb 2022 10:30), Max: 97.9 (15 Feb 2022 10:30)  HR: 101 (15 Feb 2022 10:30) (83 - 108)  BP: 116/80 (15 Feb 2022 10:30) (97/74 - 118/80)  BP(mean): --  RR: 17 (15 Feb 2022 10:30) (16 - 18)  SpO2: 93% (15 Feb 2022 10:30) (92% - 100%)    PHYSICAL EXAM:    GENERAL: NAD, well-groomed, well-developed  HEAD:  Atraumatic, Normocephalic  EYES: EOMI, PERRLA, conjunctiva and sclera clear  NECK: Supple, No JVD, Normal thyroid  NERVOUS SYSTEM:  Uncommunicative   CHEST/LUNG: Clear to percussion bilaterally; No rales, rhonchi, wheezing, or rubs  HEART: Regular rate and rhythm; No murmurs, rubs, or gallops  ABDOMEN: Soft, Nontender, Nondistended; Bowel sounds present  EXTREMITIES:  2+ Peripheral Pulses, No clubbing, cyanosis, or edema  LYMPH: No lymphadenopathy noted   RECTAL:  Deferred   SKIN: No rashes or lesions    LABS:                        14.0   7.55  )-----------( 254      ( 15 Feb 2022 08:25 )             45.8       CBC:  02-15 @ 08:25  WBC  7.55  HGB 14.0  HCT 45.8 Plate 254  MCV 96.6   @ 21:26  WBC  4.62  HGB 14.3  HCT 46.3 Plate 266  MCV 94.7           15 Feb 2022 12:05    135    |  103    |  81     ----------------------------<  69     5.8     |  23     |  3.63   2022 22:03    135    |  102    |  71     ----------------------------<  121    5.0     |  25     |  3.02     Ca    9.5        15 Feb 2022 12:05  Ca    9.2        2022 22:03  Phos  6.6       15 Feb 2022 12:05  Mg     2.5       15 Feb 2022 12:05  Mg     3.0       2022 22:03    TPro  7.1    /  Alb  2.7    /  TBili  1.3    /  DBili  x      /  AST  40     /  ALT  23     /  AlkPhos  153    15 Feb 2022 12:05  TPro  7.3    /  Alb  2.7    /  TBili  0.9    /  DBili  x      /  AST  34     /  ALT  20     /  AlkPhos  166    2022 22:03    PT/INR - ( 15 Feb 2022 12:05 )   PT: 15.0 sec;   INR: 1.31 ratio           Urinalysis Basic - ( 2022 10:39 )    Color: Yellow / Appearance: Clear / S.020 / pH: x  Gluc: x / Ketone: Trace  / Bili: Small / Urobili: 1 mg/dL   Blood: x / Protein: 100 mg/dL / Nitrite: Negative   Leuk Esterase: Small / RBC: 11-25 /HPF / WBC 11-25   Sq Epi: x / Non Sq Epi: Moderate / Bacteria: Moderate          RADIOLOGY & ADDITIONAL STUDIES: HPI:  Patient is a 89F with a PMH of CHF (EF reportedly 30% last year), HTN, HLD, hypothyroidism who presents to the ED for dyspnea.    Patient currently AAOx2, unable to provide history.  Per ED staff who spoke to family members, patient has had increased dyspnea and lower extremity swelling.  No other complaints.  SpO2 currently 100% on 4L via NC.  Hypothermic in ED - 95.3, rectal.  Labs show elevated creatinine and elevated troponins.  Will admit to tele. (2022 00:39)  ------ As Above ------- History obtained from chart, MD and daughter  Patient is a poor historian ( Not communicating ) Consult called for new onset ascites by ultrasound.   Patient has no history of ascites, liver disease, hepatitis, ETOH abuse, new medications or family history liver disease.   Recent history of nausea, vague abdominal pain, bleeding hemorrhoids and urination --> BM  Last colonoscopy was ~ 9 years ago : Polyps / Diverticulum      PAST MEDICAL & SURGICAL HISTORY:  Congestive heart failure (CHF)    Diabetes    Hypothyroidism        MEDICATIONS  (STANDING):  atorvastatin 10 milliGRAM(s) Oral at bedtime  dextrose 40% Gel 15 Gram(s) Oral once  dextrose 5%. 1000 milliLiter(s) (50 mL/Hr) IV Continuous <Continuous>  dextrose 5%. 1000 milliLiter(s) (100 mL/Hr) IV Continuous <Continuous>  dextrose 50% Injectable 25 Gram(s) IV Push once  dextrose 50% Injectable 12.5 Gram(s) IV Push once  dextrose 50% Injectable 25 Gram(s) IV Push once  furosemide   Injectable 40 milliGRAM(s) IV Push two times a day  glucagon  Injectable 1 milliGRAM(s) IntraMuscular once  influenza  Vaccine (HIGH DOSE) 0.7 milliLiter(s) IntraMuscular once  insulin lispro (ADMELOG) corrective regimen sliding scale   SubCutaneous three times a day before meals  insulin lispro (ADMELOG) corrective regimen sliding scale   SubCutaneous three times a day before meals  lactated ringers. 1000 milliLiter(s) (50 mL/Hr) IV Continuous <Continuous>  levothyroxine 50 MICROGram(s) Oral daily  metoprolol tartrate 25 milliGRAM(s) Oral two times a day    MEDICATIONS  (PRN):      Allergies    No Known Allergies    Intolerances        FAMILY HISTORY:  FH: HTN (hypertension)        REVIEW OF SYSTEMS: Uncommunicative     CONSTITUTIONAL: No fever, weight loss  EYES: No eye pain, visual disturbances, or discharge  ENMT:  No difficulty hearing, tinnitus, vertigo; No sinus or throat pain  NECK: No pain or stiffness  BREASTS: No pain, masses, or nipple discharge  RESPIRATORY: No cough, wheezing, chills or hemoptysis; No shortness of breath  CARDIOVASCULAR: No chest pain, palpitations, dizziness, or leg swelling  GASTROINTESTINAL: See above   GENITOURINARY: No dysuria, frequency, hematuria, or incontinence  NEUROLOGICAL: No headaches,  numbness, or tremors  SKIN: No itching, burning, rashes, or lesions   LYMPH NODES: No enlarged glands  ENDOCRINE: No heat or cold intolerance; No hair loss  MUSCULOSKELETAL: No joint pain or swelling; No muscle, back, or extremity pain  PSYCHIATRIC: No depression, anxiety, mood swings, or difficulty sleeping  HEME/LYMPH: No easy bruising, or bleeding gums  ALLERGY AND IMMUNOLOGIC: No hives or eczema          SOCIAL HISTORY:    FAMILY HISTORY:  FH: HTN (hypertension)        Vital Signs Last 24 Hrs  T(C): 36.6 (15 Feb 2022 10:30), Max: 36.6 (2022 15:20)  T(F): 97.9 (15 Feb 2022 10:30), Max: 97.9 (15 Feb 2022 10:30)  HR: 101 (15 Feb 2022 10:30) (83 - 108)  BP: 116/80 (15 Feb 2022 10:30) (97/74 - 118/80)  BP(mean): --  RR: 17 (15 Feb 2022 10:30) (16 - 18)  SpO2: 93% (15 Feb 2022 10:30) (92% - 100%)    PHYSICAL EXAM:    GENERAL: NAD, well-groomed, well-developed  HEAD:  Atraumatic, Normocephalic  EYES: EOMI, PERRLA, conjunctiva and sclera clear  NECK: Supple, No JVD, Normal thyroid  NERVOUS SYSTEM:  Uncommunicative   CHEST/LUNG: Clear to percussion bilaterally; No rales, rhonchi, wheezing, or rubs  HEART: Regular rate and rhythm; No murmurs, rubs, or gallops  ABDOMEN: Soft, Nontender, Nondistended; Bowel sounds present  EXTREMITIES:  2+ Peripheral Pulses, No clubbing, cyanosis, or edema  LYMPH: No lymphadenopathy noted   RECTAL:  Deferred   SKIN: No rashes or lesions    LABS:                        14.0   7.55  )-----------( 254      ( 15 Feb 2022 08:25 )             45.8       CBC:  02-15 @ 08:25  WBC  7.55  HGB 14.0  HCT 45.8 Plate 254  MCV 96.6   @ 21:26  WBC  4.62  HGB 14.3  HCT 46.3 Plate 266  MCV 94.7           15 Feb 2022 12:05    135    |  103    |  81     ----------------------------<  69     5.8     |  23     |  3.63   2022 22:03    135    |  102    |  71     ----------------------------<  121    5.0     |  25     |  3.02     Ca    9.5        15 Feb 2022 12:05  Ca    9.2        2022 22:03  Phos  6.6       15 Feb 2022 12:05  Mg     2.5       15 Feb 2022 12:05  Mg     3.0       2022 22:03    TPro  7.1    /  Alb  2.7    /  TBili  1.3    /  DBili  x      /  AST  40     /  ALT  23     /  AlkPhos  153    15 Feb 2022 12:05  TPro  7.3    /  Alb  2.7    /  TBili  0.9    /  DBili  x      /  AST  34     /  ALT  20     /  AlkPhos  166    2022 22:03    PT/INR - ( 15 Feb 2022 12:05 )   PT: 15.0 sec;   INR: 1.31 ratio           Urinalysis Basic - ( 2022 10:39 )    Color: Yellow / Appearance: Clear / S.020 / pH: x  Gluc: x / Ketone: Trace  / Bili: Small / Urobili: 1 mg/dL   Blood: x / Protein: 100 mg/dL / Nitrite: Negative   Leuk Esterase: Small / RBC: 11-25 /HPF / WBC 11-25   Sq Epi: x / Non Sq Epi: Moderate / Bacteria: Moderate          RADIOLOGY & ADDITIONAL STUDIES:  < from: US Abdomen Complete (US Abdomen Complete .) (22 @ 12:48) >    ACC: [id number] EXAM:  US ABDOMEN COMPLETE                          PROCEDURE DATE:  2022          INTERPRETATION:  CLINICAL INFORMATION: Ascites follow-up. Distended   bladder with little output from Smith    COMPARISON: None available.    TECHNIQUE: Sonography of the abdomen.    FINDINGS:    Liver: Heterogeneous echotexture with nodular contour suggesting   cirrhosis. No hepatic biliary dilatation.  Bile ducts: Normal caliber. Common bile duct measures 3 mm.  Gallbladder: Multiple gallstones. Thickened gallbladder wall measuring 7   mm.  Pancreas: Poorly visualized.  Spleen: 6.8 cm. Within normal limits.  Right kidney: 8.6 cm. Multiple renal cysts with the largest measuring 2.0   x 2.0 cm. Echogenic calcification measuring 2.1 cm within the upper pole   the RIGHT kidney. Possible partial staghorn. 4 mm nonobstructing calculus   in the lower pole of the RIGHT kidney.. Increased echogenicity.  Left kidney: 8.1 cm.  Multiple renal cysts with the largest measuring 5.9   x 5.4 cm within the interpolar region.. Increased echogenicity.  Ascites: Large volume ascites.  Bladder: Collapsed with Smith catheter in place.  Aorta and IVC: Calcifications of the aorta, atherosclerosis.  Lung bases: Bilateral pleural effusions.    IMPRESSION:  1.  Large volume ascites with bilateral pleural effusions.  2.  Nodular contour of the liver suggesting cirrhosis.  3.  Thickened gallbladder wall with cholelithiasis. No other evidence of   acute cholecystitis. Gallbladder wall thickening likely dueto ascites,   low albumin state, or fluid overload.  4.  Bilateral renal cysts.  5.  Large stone in the upper pole of the RIGHT kidney measuring 2.1 cm   without evidence of hydronephrosis.  6.  Bilateral increased renal echogenicity may represent underlying   medical renal disease.  7.  Collapsed bladder with Smith in place.    --- End of Report ---            PIPO THAO MD; Attending Radiologist  This document has been electronically signed. 2022  3:49PM    < end of copied text >

## 2022-02-15 NOTE — DIETITIAN INITIAL EVALUATION ADULT. - OTHER INFO
Unable to interview pt due to AMS, advanced age, Spoke to daughter Jaz via phone ([phone]). Daughter reports pt doing well for her age up until approx. 2 weeks ago; reports decreased PO intake, SOB, & b/l leg swelling. Pt was originally taking Lasix every other day; daughter then increased (per MD instructions) to 40 mg every day. Wt increased over a period of several weeks despite increased medication; 175# (1/14); 183# (1/28); 198# (2/11).  Pt UBW approx 170#.  Daughter reports she cuts-up food for pt; sometimes assists her c eating as needed. No reports of any N/V/D/C.  Pt likes Ensure supplement. Palliative care consult for GOC; pt currently remains full code.

## 2022-02-15 NOTE — DIETITIAN INITIAL EVALUATION ADULT. - OTHER CALCULATIONS
Ht (cm):   167.6  Wt (kg):  95.7 (2/15)   BMI: 34      IBW:  59.1 kg  %IBW: 162%  UBW:  77.3 kg  %UBW: 120%

## 2022-02-15 NOTE — CONSULT NOTE ADULT - REASON FOR ADMISSION
dyspnea
Additional Notes: Discussed to continue birth control. Advised to take a fish oil supplement daily. \\nDiscussed to discontinue eating red meat for now, and only eat chicken and fish.
Detail Level: Simple
Additional Notes: Has standing orders for labs. Discussed that this will be the last month of treatment. Discussed that it is possible for pt to need Accutane again in a few years since she was young during the first dose of treatment. Pt expressed understanding.
Additional Notes: DISCUSSED TAKING HELIOCARE DAILY.\\nSAMPLES OF HELIOCARE GIVEN TO PATIENT.

## 2022-02-15 NOTE — CONSULT NOTE ADULT - ASSESSMENT
89 year old female PMH of heart failure, DM and hypothyroidism presented to hospital with dyspnea.  Patient requiring supplemental oxygen and found to have elevated trop and BNP.  Palliative care consulted for GOC.  
Dyspnea at rest  Hypothermia  Acute on chronic combined sys/diast heart failure  Pleural effusion  MARINA  Urinary retention  Mildly elevated troponin    Plan:  Smith placement.  Assess urine output, on IV Lasix.  Continue tracking troponins serially, likely type II MI in setting of heart failure and renal insufficiency In any case, given age and debility, will not pursue further risk stratification. Currently no chest pain noted.  Continue bbl, I Lasix. Avoiding ACEI/ARB/ARNI/ALdactone due to renal insufficiency.  TTE requested for EF assessment.  Monitoring pleural effusion, may need therapeutic drainage to aid in respiration.  Palliative care assessment?
89 F with CHF admitted with acute on chronic systolic heart failure exacerbation   she has pulmonary edema and effusions on CXR (I personally reviewed)   Her BNP is elevated   echo reviewed: moderate AS and global systolic dysfunction,  low EF  she has large volume ascites which may be transudative and would go along with heart failure or cirrhosis   paracentesis today->please continue albumin supplementation   would hold off antibiotics   can send procalcitonin  management of CHF and diuretics per medicine and cardiology   ongoing discussions with family regarding GOC       D/W Dr. Mcclellan and Dr. Keya Vicente, DO  Infectious Disease Attending  Reachable via Microsoft Teams or [place] office: [phone]  After 5pm/weekends please call [phone] for all inquiries and new consults         
HPI:  Patient is a 89F with a PMH of CHF (EF reportedly 30% last year), HTN, HLD, hypothyroidism who presents to the ED for dyspnea.    Patient currently AAOx2, unable to provide history.  Per ED staff who spoke to family members, patient has had increased dyspnea and lower extremity swelling.  No other complaints.  SpO2 currently 100% on 4L via NC.  Hypothermic in ED - 95.3, rectal.  Labs show elevated creatinine and elevated troponins.  Will admit to tele. (14 Feb 2022 00:39)  ------ As Above ------- History obtained from chart, MD and daughter  Patient is a poor historian ( Not communicating ) Consult called for new onset ascites by ultrasound.   Patient has no history of ascites, liver disease, hepatitis, ETOH abuse, new medications or family history liver disease.   Recent history of nausea, vague abdominal pain, bleeding hemorrhoids and urination --> BM  Last colonoscopy was ~ 9 years ago : Polyps / Diverticulum    A) Ascites - New onset - Probably from long standing congestive liver --> cirrhosis VS acute congestive liver, renal disease, malignancy etc. 1) Diagnostic ( R/O SBP- Patient with abdominal pain , cytology, and albumin gradient ) VS Therapeutic ( abdominal pain ) 2) Liver blood tests 3) Agree with diuretics 4) No liver biopsy at the present time   B) Colon cancer screening - history of polyps - Supportive care for now

## 2022-02-15 NOTE — CONSULT NOTE ADULT - CONSULT REQUESTED DATE/TIME
15-Feb-2022 23:32
15-Feb-2022 11:00
15-Feb-2022 18:57
14-Feb-2022 12:08
15-Feb-2022 10:23
14-Feb-2022 13:52

## 2022-02-15 NOTE — PROGRESS NOTE ADULT - ASSESSMENT
Acute on chronic systolic congestive heart failure.   ·  Plan: pulm edema on CXR, bilateral leg swelling  Continue Lasix IV   Paracenthesis today   Smith       ·  Problem: Troponin level elevated.   Cardiology following     Problem/Plan - 3:  ·  Problem: Essential hypertension.   ·  Plan: metoprolol.     Problem/Plan - 4:  ·  Problem: Hypothyroidism.   ·  Plan: synthroid.     Problem/Plan - 5:  ·  Problem: Hyperlipidemia.   ·  Plan: lipitor.     Problem/Plan - 6:  ·  Problem: Preventive measure.   ·  Plan: VTE prop: heparin sc q12 hrs.    Spoke with daughter- still pt FULL CODE, Palliative onboard

## 2022-02-15 NOTE — CONSULT NOTE ADULT - SUBJECTIVE AND OBJECTIVE BOX
GANESH MOISE  MRN-[id number]        Patient is a 89y old  Female who presents with a chief complaint of dyspnea (15 Feb 2022 19:24)      HPI:  Patient is a 89F with a PMH of CHF (EF reportedly 30% last year), HTN, HLD, hypothyroidism who presents to the ED for dyspnea.    Patient currently AAOx2, unable to provide history.  Per ED staff who spoke to family members, patient has had increased dyspnea and lower extremity swelling.  No other complaints.  SpO2 currently 100% on 4L via NC.  Hypothermic in ED - 95.3, rectal.  Labs show elevated creatinine and elevated troponins.  Will admit to tele. (2022 00:39)    patient is altered and unable to provide more hx at this time. She is admitted with CHF exacerbation and extremely volume overloaded.  ID consulted for workup and antibiotic management     PAST MEDICAL & SURGICAL HISTORY:  CAD (coronary artery disease)    Diabetes mellitus, type 2    HLD (hyperlipidemia)    HTN (hypertension), benign    Hypothyroidism    Congestive heart failure (CHF)    Diabetes    Hypothyroidism    History of hysterectomy    History of cataract extraction    History of thyroidectomy, total        Allergies  No Known Allergies        ANTIMICROBIALS:      MEDICATIONS  (STANDING):  piperacillin/tazobactam IVPB.   200 mL/Hr IV Intermittent (02-15-22 @ 00:59)    vancomycin  IVPB   250 mL/Hr IV Intermittent (02-15-22 @ 01:20)        OTHER MEDS: MEDICATIONS  (STANDING):  atorvastatin 10 at bedtime  dextrose 40% Gel 15 once  dextrose 50% Injectable 25 once  dextrose 50% Injectable 25 once  dextrose 50% Injectable 12.5 once  furosemide   Injectable 40 two times a day  glucagon  Injectable 1 once  influenza  Vaccine (HIGH DOSE) 0.7 once  insulin lispro (ADMELOG) corrective regimen sliding scale  three times a day before meals  insulin lispro (ADMELOG) corrective regimen sliding scale  three times a day before meals  levothyroxine 50 daily  metoprolol tartrate 25 two times a day      SOCIAL HISTORY:     unable due to mental status     FAMILY HISTORY:  FH: HTN (hypertension)        REVIEW OF SYSTEMS  [ X ] ROS unobtainable because:  AMS  [  ] All other systems negative except as noted below:	    Constitutional:  [ ] fever [ ] chills  [ ] weight loss  [ ] weakness  Skin:  [ ] rash [ ] phlebitis	  Eyes: [ ] icterus [ ] pain  [ ] discharge	  ENMT: [ ] sore throat  [ ] thrush [ ] ulcers [ ] exudates  Respiratory: [ ] dyspnea [ ] hemoptysis [ ] cough [ ] sputum	  Cardiovascular:  [ ] chest pain [ ] palpitations [ ] edema	  Gastrointestinal:  [ ] nausea [ ] vomiting [ ] diarrhea [ ] constipation [ ] pain	  Genitourinary:  [ ] dysuria [ ] frequency [ ] hematuria [ ] discharge [ ] flank pain  [ ] incontinence  Musculoskeletal:  [ ] myalgias [ ] arthralgias [ ] arthritis  [ ] back pain  Neurological:  [ ] headache [ ] seizures  [ ] confusion/altered mental status  Psychiatric:  [ ] anxiety [ ] depression	  Hematology/Lymphatics:  [ ] lymphadenopathy  Endocrine:  [ ] adrenal [ ] thyroid  Allergic/Immunologic:	 [ ] transplant [ ] seasonal    Vital Signs Last 24 Hrs  T(F): 96.9 (02-15-22 @ 20:22), Max: 98.4 (22 @ 04:38)    Vital Signs Last 24 Hrs  HR: 100 (02-15-22 @ 21:09) (89 - 108)  BP: 107/68 (02-15-22 @ 20:22) (90/61 - 118/80)  RR: 20 (02-15-22 @ 20:22)  SpO2: 89% (02-15-22 @ 21:09) (89% - 100%)  Wt(kg): --    PHYSICAL EXAM:  Constitutional: ill appearing female slightly tachypneic   HEAD/EYES: anicteric, no conjunctival injection  ENT:  supple, no thrush  Cardiovascular:   S1, S2, + murmur, severe 3+ edema/anasarca  Respiratory:  overall diminished but there are crackles  bilaterally, no wheezes  GI:  soft, non-tender, normal bowel sounds  :  +butler  Neurologic: awake and alert to person, doesnt follow commands  Skin:  no rash, no erythema, no phlebitis  Heme/Onc: no lymphadenopathy   Psychiatric: calm          WBC Count: 7.55 K/uL (02-15 @ 08:25)  WBC Count: 4.62 K/uL ( @ 21:26)      Auto Neutrophil %: 81.4 % *H* (02-15-22 @ 08:25)  Auto Neutrophil #: 6.14 K/uL (02-15-22 @ 08:25)  Auto Neutrophil %: 82.0 % *H* (22 @ 21:26)  Auto Neutrophil #: 3.79 K/uL (22 @ 21:26)                            14.0   7.55  )-----------( 254      ( 15 Feb 2022 08:25 )             45.8       02-15    135  |  103  |  81<H>  ----------------------------<  69<L>  5.8<H>   |  23  |  3.63<H>    Ca    9.5      15 Feb 2022 12:05  Phos  6.6     02-15  Mg     2.5     02-15    TPro  7.1  /  Alb  2.7<L>  /  TBili  1.3<H>  /  DBili  x   /  AST  40<H>  /  ALT  23  /  AlkPhos  153<H>  02-15      Creatinine Trend: 3.63<--, 3.02<--    Urinalysis Basic - ( 2022 10:39 )    Color: Yellow / Appearance: Clear / S.020 / pH: x  Gluc: x / Ketone: Trace  / Bili: Small / Urobili: 1 mg/dL   Blood: x / Protein: 100 mg/dL / Nitrite: Negative   Leuk Esterase: Small / RBC: 11-25 /HPF / WBC 11-25   Sq Epi: x / Non Sq Epi: Moderate / Bacteria: Moderate        MICROBIOLOGY:    SARS-CoV-2: NotDetec (2022 21:26)    Rapid RVP Result: NotDetec ( @ 21:26)    D-Dimer Assay, Quantitative: 2721 ()      SARS-CoV-2: NotDetec (22 @ 21:26)  Rapid RVP Result: NotDetec (22 @ 21:26)      RADIOLOGY:  < from: US Abdomen Complete (US Abdomen Complete .) (22 @ 12:48) >  IMPRESSION:  1.  Large volume ascites with bilateral pleural effusions.  2.  Nodular contour of the liver suggesting cirrhosis.  3.  Thickened gallbladder wall with cholelithiasis. No other evidence of   acute cholecystitis. Gallbladder wall thickening likely dueto ascites,   low albumin state, or fluid overload.  4.  Bilateral renal cysts.  5.  Large stone in the upper pole of the RIGHT kidney measuring 2.1 cm   without evidence of hydronephrosis.  6.  Bilateral increased renal echogenicity may represent underlying   medical renal disease.  7.  Collapsed bladder with Butler in place.    Xray Chest 1 View- PORTABLE-Urgent (22 @ 21:12) >    IMPRESSION: Probable heart enlargement. Right effusion and scattered   infiltrates as above.      < from: TTE Echo Complete w/o Contrast w/ Doppler (22 @ 12:30) >    Summary:   1. Left ventricular ejection fraction, by visual estimation, is 30 to   35%.   2. Severely decreased global left ventricular systolic function.   3. Severely enlarged right atrium.   4. The left ventricular diastolic function could not be assessed in this   study.   5. There is moderate concentric left ventricular hypertrophy.   6. Mildly increased RV wall thickness.   7. Moderately enlarged right ventricle.   8. Mildly enlarged left atrium.   9. No evidence of mitral valve regurgitation.  10. Severe tricuspid regurgitation.  11. Sclerotic aortic valve with decreased opening.  12. Mild pulmonic valve regurgitation.  13. Estimated pulmonary artery systolic pressure is 42.5 mmHg assuming a   right atrial pressure of 15 mmHg, which is consistent with mild pulmonary   hypertension.  14. Peak transaortic gradient equals 8.4 mmHg,mean transaortic gradient   equals 3.2 mmHg, the calculated aortic valve area equals 1.33 cm² by the   continuity equation consistent with moderate aortic stenosis.  15. Basal and mid inferior wall, basal and mid inferolateral wall, mid   inferoseptal segment, and mid anterolateral segment are abnormal as   described above.

## 2022-02-15 NOTE — PROGRESS NOTE ADULT - SUBJECTIVE AND OBJECTIVE BOX
Patient is a 89y old Female who presents with a chief complaint of dyspnea. (15 Feb 2022 10:22)    PAST MEDICAL & SURGICAL HISTORY:  Congestive heart failure (CHF)    Diabetes    Hypothyroidism    INTERVAL HISTORY: Elderly female laying in bed in no acute distress. Patient is non-verbal vs aphasic. +Dyspnea, +nasal flaring.   	  MEDICATIONS:  MEDICATIONS  (STANDING):  atorvastatin 10 milliGRAM(s) Oral at bedtime  furosemide   Injectable 40 milliGRAM(s) IV Push two times a day  glucagon  Injectable 1 milliGRAM(s) IntraMuscular once  influenza  Vaccine (HIGH DOSE) 0.7 milliLiter(s) IntraMuscular once  insulin lispro (ADMELOG) corrective regimen sliding scale   SubCutaneous three times a day before meals  insulin lispro (ADMELOG) corrective regimen sliding scale   SubCutaneous three times a day before meals  lactated ringers. 1000 milliLiter(s) (50 mL/Hr) IV Continuous <Continuous>  levothyroxine 50 MICROGram(s) Oral daily  metoprolol tartrate 25 milliGRAM(s) Oral two times a day    Vitals:  T(F): 97.9 (02-15-22 @ 10:30), Max: 97.9 (02-15-22 @ 10:30)  HR: 101 (02-15-22 @ 10:30) (83 - 108)  BP: 116/80 (02-15-22 @ 10:30) (97/74 - 118/80)  RR: 17 (02-15-22 @ 10:30) (16 - 18)  SpO2: 93% (02-15-22 @ 10:30) (92% - 100%)  02-14 @ 07:01  -  02-15 @ 07:00  --------------------------------------------------------  IN:  Total IN: 0 mL    OUT:    Voided (mL): 50 mL  Total OUT: 50 mL    Total NET: -50 mL    Weight (kg): 89.9 (02-14 @ 04:38)  BMI (kg/m2): 32 (02-14 @ 04:38)    PHYSICAL EXAM:  Neuro: Awake, responsive. Non-verbal.   CV: S1 S2 RRR  Lungs: Diminished bases to auscultation. + abdominal accessory muscle use.  GI: Distended, Soft, BS +, NT  Extremities: +++LE edema, and anasarca.    TELEMETRY: Atrial fibrillation  	    RADIOLOGY:     < from: Xray Chest 1 View- PORTABLE-Urgent (02.13.22 @ 21:12) >  IMPRESSION: Probable heart enlargement. Right effusion and scattered   infiltrates as above.    < end of copied text >    < from: US Abdomen Complete (US Abdomen Complete .) (02.14.22 @ 12:48) >  IMPRESSION:  1.  Large volume ascites with bilateral pleural effusions.  2.  Nodular contour of the liver suggesting cirrhosis.  3.  Thickened gallbladder wall with cholelithiasis. No other evidence of   acute cholecystitis. Gallbladder wall thickening likely dueto ascites,   low albumin state, or fluid overload.  4.  Bilateral renal cysts.  5.  Large stone in the upper pole of the RIGHT kidney measuring 2.1 cm   without evidence of hydronephrosis.  6.  Bilateral increased renal echogenicity may represent underlying   medical renal disease.  7.  Collapsed bladder with Smith in place.    < end of copied text >      DIAGNOSTIC TESTING:    [X] Echocardiogram:  < from: TTE Echo Complete w/o Contrast w/ Doppler (02.14.22 @ 12:30) >  Summary:   1. Left ventricular ejection fraction, by visual estimation, is 30 to   35%.   2. Severely decreased global left ventricular systolic function.   3. Severely enlarged right atrium.   4. The left ventricular diastolic function could not be assessed in this   study.   5. There is moderate concentric left ventricular hypertrophy.   6. Mildly increased RV wall thickness.   7. Moderately enlarged right ventricle.   8. Mildly enlarged left atrium.   9. No evidence of mitral valve regurgitation.  10. Severe tricuspid regurgitation.  11. Sclerotic aortic valve with decreased opening.  12. Mild pulmonic valve regurgitation.  13. Estimated pulmonary artery systolic pressure is 42.5 mmHg assuming a   right atrial pressure of 15 mmHg, which is consistent with mild pulmonary   hypertension.  14. Peak transaortic gradient equals 8.4 mmHg,mean transaortic gradient   equals 3.2 mmHg, the calculated aortic valve area equals 1.33 cm² by the   continuity equation consistent with moderate aortic stenosis.  15. Basal and mid inferior wall, basal and mid inferolateral wall, mid   inferoseptal segment, and mid anterolateral segment are abnormal as   described above.    < end of copied text >    LABS:	 	    CARDIAC MARKERS:  Troponin I, High Sensitivity Result: 88.1 ng/L (02-13 @ 22:03)    15 Feb 2022 12:05    135    |  103    |  81     ----------------------------<  69     5.8     |  23     |  3.63   13 Feb 2022 22:03    135    |  102    |  71     ----------------------------<  121    5.0     |  25     |  3.02     Ca    9.5        15 Feb 2022 12:05  Mg     2.5       15 Feb 2022 12:05    TPro  x      /  Alb  2.7    /  TBili  x      /  DBili  x      /  AST  40     /  ALT  23     /  AlkPhos  x      15 Feb 2022 12:05                          14.0   7.55  )-----------( 254      ( 15 Feb 2022 08:25 )             45.8 ,                       14.3   4.62  )-----------( 266      ( 13 Feb 2022 21:26 )             46.3   proBNP: Serum Pro-Brain Natriuretic Peptide: 34451 pg/mL (02-13 @ 22:03)    PT/PTT- ( 15 Feb 2022 12:05 )   PT: 15.0 sec;  PTT: x        INR: 1.31 ratio (02-15 @ 12:05)     Patient is a 89y old Female who presents with a chief complaint of dyspnea. (15 Feb 2022 10:22)    PAST MEDICAL & SURGICAL HISTORY:  Congestive heart failure (CHF)    Diabetes    Hypothyroidism    INTERVAL HISTORY: lethargic, +Dyspnea, +nasal flaring.  	  MEDICATIONS:  MEDICATIONS  (STANDING):  atorvastatin 10 milliGRAM(s) Oral at bedtime  furosemide   Injectable 40 milliGRAM(s) IV Push two times a day  influenza  Vaccine (HIGH DOSE) 0.7 milliLiter(s) IntraMuscular once  insulin lispro (ADMELOG) corrective regimen sliding scale   SubCutaneous three times a day before meals  insulin lispro (ADMELOG) corrective regimen sliding scale   SubCutaneous three times a day before meals  lactated ringers. 1000 milliLiter(s) (50 mL/Hr) IV Continuous <Continuous>  levothyroxine 50 MICROGram(s) Oral daily  metoprolol tartrate 25 milliGRAM(s) Oral two times a day    Vitals:  T(F): 97.9 (02-15-22 @ 10:30), Max: 97.9 (02-15-22 @ 10:30)  HR: 101 (02-15-22 @ 10:30) (83 - 108)  BP: 116/80 (02-15-22 @ 10:30) (97/74 - 118/80)  RR: 17 (02-15-22 @ 10:30) (16 - 18)  SpO2: 93% (02-15-22 @ 10:30) (92% - 100%)  02-14 @ 07:01  -  02-15 @ 07:00  --------------------------------------------------------  IN:  Total IN: 0 mL    OUT:    Voided (mL): 50 mL  Total OUT: 50 mL    Total NET: -50 mL    Weight (kg): 89.9 (02-14 @ 04:38)  BMI (kg/m2): 32 (02-14 @ 04:38)    PHYSICAL EXAM:  Neuro: Awake, responsive. Non-verbal.   CV: S1 S2 RRR  Lungs: Diminished bases to auscultation. + abdominal accessory muscle use.  GI: Distended, Soft, BS +, NT  Extremities: +++LE edema, and anasarca.    TELEMETRY: Atrial fibrillation  	    RADIOLOGY:     < from: Xray Chest 1 View- PORTABLE-Urgent (02.13.22 @ 21:12) >  IMPRESSION: Probable heart enlargement. Right effusion and scattered   infiltrates as above.    < end of copied text >    < from: US Abdomen Complete (US Abdomen Complete .) (02.14.22 @ 12:48) >  IMPRESSION:  1.  Large volume ascites with bilateral pleural effusions.  2.  Nodular contour of the liver suggesting cirrhosis.  3.  Thickened gallbladder wall with cholelithiasis. No other evidence of   acute cholecystitis. Gallbladder wall thickening likely dueto ascites,   low albumin state, or fluid overload.  4.  Bilateral renal cysts.  5.  Large stone in the upper pole of the RIGHT kidney measuring 2.1 cm   without evidence of hydronephrosis.  6.  Bilateral increased renal echogenicity may represent underlying   medical renal disease.  7.  Collapsed bladder with Smith in place.    < end of copied text >      DIAGNOSTIC TESTING:    [X] Echocardiogram:  < from: TTE Echo Complete w/o Contrast w/ Doppler (02.14.22 @ 12:30) >  Summary:   1. Left ventricular ejection fraction, by visual estimation, is 30 to   35%.   2. Severely decreased global left ventricular systolic function.   3. Severely enlarged right atrium.   4. The left ventricular diastolic function could not be assessed in this   study.   5. There is moderate concentric left ventricular hypertrophy.   6. Mildly increased RV wall thickness.   7. Moderately enlarged right ventricle.   8. Mildly enlarged left atrium.   9. No evidence of mitral valve regurgitation.  10. Severe tricuspid regurgitation.  11. Sclerotic aortic valve with decreased opening.  12. Mild pulmonic valve regurgitation.  13. Estimated pulmonary artery systolic pressure is 42.5 mmHg assuming a   right atrial pressure of 15 mmHg, which is consistent with mild pulmonary   hypertension.  14. Peak transaortic gradient equals 8.4 mmHg,mean transaortic gradient   equals 3.2 mmHg, the calculated aortic valve area equals 1.33 cm² by the   continuity equation consistent with moderate aortic stenosis.  15. Basal and mid inferior wall, basal and mid inferolateral wall, mid   inferoseptal segment, and mid anterolateral segment are abnormal as   described above.    < end of copied text >    LABS:	 	    CARDIAC MARKERS:  Troponin I, High Sensitivity Result: 88.1 ng/L (02-13 @ 22:03)    15 Feb 2022 12:05    135    |  103    |  81     ----------------------------<  69     5.8     |  23     |  3.63   13 Feb 2022 22:03    135    |  102    |  71     ----------------------------<  121    5.0     |  25     |  3.02     Ca    9.5        15 Feb 2022 12:05  Mg     2.5       15 Feb 2022 12:05    TPro  x      /  Alb  2.7    /  TBili  x      /  DBili  x      /  AST  40     /  ALT  23     /  AlkPhos  x      15 Feb 2022 12:05                          14.0   7.55  )-----------( 254      ( 15 Feb 2022 08:25 )             45.8 ,                       14.3   4.62  )-----------( 266      ( 13 Feb 2022 21:26 )             46.3   proBNP: Serum Pro-Brain Natriuretic Peptide: 27859 pg/mL (02-13 @ 22:03)    PT/PTT- ( 15 Feb 2022 12:05 )   PT: 15.0 sec;  PTT: x        INR: 1.31 ratio (02-15 @ 12:05)     Patient is a 89y old Female who presents with a chief complaint of dyspnea. (15 Feb 2022 10:22)    PAST MEDICAL & SURGICAL HISTORY:  Congestive heart failure (CHF)    Diabetes    HTN    Hypothyroidism    INTERVAL HISTORY: lethargic, +Dyspnea, +nasal flaring.  	  MEDICATIONS:  MEDICATIONS  (STANDING):  atorvastatin 10 milliGRAM(s) Oral at bedtime  furosemide   Injectable 40 milliGRAM(s) IV Push two times a day  influenza  Vaccine (HIGH DOSE) 0.7 milliLiter(s) IntraMuscular once  insulin lispro (ADMELOG) corrective regimen sliding scale   SubCutaneous three times a day before meals  insulin lispro (ADMELOG) corrective regimen sliding scale   SubCutaneous three times a day before meals  lactated ringers. 1000 milliLiter(s) (50 mL/Hr) IV Continuous <Continuous>  levothyroxine 50 MICROGram(s) Oral daily  metoprolol tartrate 25 milliGRAM(s) Oral two times a day    Vitals:  T(F): 97.9 (02-15-22 @ 10:30), Max: 97.9 (02-15-22 @ 10:30)  HR: 101 (02-15-22 @ 10:30) (83 - 108)  BP: 116/80 (02-15-22 @ 10:30) (97/74 - 118/80)  RR: 17 (02-15-22 @ 10:30) (16 - 18)  SpO2: 93% (02-15-22 @ 10:30) (92% - 100%)  02-14 @ 07:01  -  02-15 @ 07:00  --------------------------------------------------------  IN:  Total IN: 0 mL    OUT:    Voided (mL): 50 mL  Total OUT: 50 mL    Total NET: -50 mL    Weight (kg): 89.9 (02-14 @ 04:38)  BMI (kg/m2): 32 (02-14 @ 04:38)    PHYSICAL EXAM:  Neuro: lethargic   CV: S1 S2 irreg irregular + sM  Lungs: Diminished bases to auscultation. + abdominal accessory muscle use.  GI: Distended, Soft, BS +, NT  Extremities: +++LE edema, and anasarca.    TELEMETRY: Atrial fibrillation  	    RADIOLOGY:     < from: Xray Chest 1 View- PORTABLE-Urgent (02.13.22 @ 21:12) >  IMPRESSION: Probable heart enlargement. Right effusion and scattered   infiltrates as above.    < end of copied text >    < from: US Abdomen Complete (US Abdomen Complete .) (02.14.22 @ 12:48) >  IMPRESSION:  1.  Large volume ascites with bilateral pleural effusions.  2.  Nodular contour of the liver suggesting cirrhosis.  3.  Thickened gallbladder wall with cholelithiasis. No other evidence of   acute cholecystitis. Gallbladder wall thickening likely dueto ascites,   low albumin state, or fluid overload.  4.  Bilateral renal cysts.  5.  Large stone in the upper pole of the RIGHT kidney measuring 2.1 cm   without evidence of hydronephrosis.  6.  Bilateral increased renal echogenicity may represent underlying   medical renal disease.  7.  Collapsed bladder with Smith in place.    < end of copied text >      DIAGNOSTIC TESTING:    [X] Echocardiogram:< from: TTE Echo Complete w/o Contrast w/ Doppler (02.14.22 @ 12:30) >  Left Ventricle: The left ventricular internal cavity size is normal. Left   ventricular wall thickness is normal.  Global LV systolic function was severely decreased. Left ventricular   ejection fraction, by visual estimation, is 30 to 35%. The   interventricular septum is flattened in diastole ('D' shaped left   ventricle), consistent with right ventricular volume overload. The left   ventricular diastolic function couldnot be assessed in this study.      LV Wall Scoring:  The basal and mid inferior wall, basal and mid inferolateral wall, mid  inferoseptal segment, and mid anterolateral segment are akinetic.    Right Ventricle: The right ventricular size is moderately enlarged. RV   wall thickness is mildly increased. RV systolic function is normal.   Findings are consistent with pulmonary hypertension.  Left Atrium: Mildly enlarged left atrium. Atrial septum bowing into left   atrium due to increased RA pressure.  Right Atrium: The right atrial pressure is moderately elevated. Severely   enlarged right atrium.  Pericardium: There is no evidence of pericardial effusion.  Mitral Valve: Structurally normal mitral valve, with normal leaflet   excursion. No evidence of mitral valve regurgitation is seen.  Tricuspid Valve: Structurally normal tricuspid valve, with normal leaflet   excursion. Severe tricuspid regurgitation is visualized. Estimated   pulmonary artery systolic pressure is 42.5 mmHg assuming a right atrial   pressure of 15 mmHg, which is consistent with mild pulmonary hypertension.  Aortic Valve: Sclerotic aortic valve with decreased opening. Peak   transaortic gradient equals 8.4 mmHg, mean transaortic gradient equals   3.2 mmHg, the calculated aortic valve area equals 1.33 cm² by the   continuity equation consistent with moderate aortic stenosis. The peak   aortic velocity was obtained from the apical view. No evidence of aortic   valve regurgitation is seen.  Pulmonic Valve: Structurally normal pulmonic valve, with normal leaflet   excursion. Mild pulmonic valve regurgitation.  Aorta: The aortic root and ascending aorta are structurally normal, with   no evidence of dilitation.  Pulmonary Artery: The main pulmonary artery is normalin size.  Venous: The inferior vena cava was dilated, with respiratory size   variation less than 50%, consistent with elevated right atrial pressure.    Summary:   1. Left ventricular ejection fraction, by visual estimation, is 30 to   35%.   2. Severely decreased global left ventricular systolic function.   3. Severely enlarged right atrium.   4. The left ventricular diastolic function could not be assessed in this   study.   5. There is moderate concentric left ventricular hypertrophy.   6. Mildly increased RV wall thickness.   7. Moderately enlarged right ventricle.   8. Mildly enlarged left atrium.   9. No evidence of mitral valve regurgitation.  10. Severe tricuspid regurgitation.  11. Sclerotic aortic valve with decreased opening.  12. Mild pulmonic valve regurgitation.  13. Estimated pulmonary artery systolic pressure is 42.5 mmHg assuming a   right atrial pressure of 15 mmHg, which is consistent with mild pulmonary   hypertension.  14. Peak transaortic gradient equals 8.4 mmHg,mean transaortic gradient   equals 3.2 mmHg, the calculated aortic valve area equals 1.33 cm² by the   continuity equation consistent with moderate aortic stenosis.  15. Basal and mid inferior wall, basal and mid inferolateral wall, mid   inferoseptal segment, and mid anterolateral segment are abnormal as   described above.    < end of copied text >    LABS:	 	    CARDIAC MARKERS:  Troponin I, High Sensitivity Result: 88.1 ng/L (02-13 @ 22:03)    15 Feb 2022 12:05    135    |  103    |  81     ----------------------------<  69     5.8     |  23     |  3.63   13 Feb 2022 22:03    135    |  102    |  71     ----------------------------<  121    5.0     |  25     |  3.02     Ca    9.5        15 Feb 2022 12:05  Mg     2.5       15 Feb 2022 12:05    TPro  x      /  Alb  2.7    /  TBili  x      /  DBili  x      /  AST  40     /  ALT  23     /  AlkPhos  x      15 Feb 2022 12:05                        14.0   7.55  )-----------( 254      ( 15 Feb 2022 08:25 )             45.8 ,                       14.3   4.62  )-----------( 266      ( 13 Feb 2022 21:26 )             46.3   proBNP: Serum Pro-Brain Natriuretic Peptide: 59225 pg/mL (02-13 @ 22:03)    PT/PTT- ( 15 Feb 2022 12:05 )   PT: 15.0 sec;  PTT: x        INR: 1.31 ratio (02-15 @ 12:05)     Patient is a 89y old Female who presents with a chief complaint of dyspnea. (15 Feb 2022 10:22)    PAST MEDICAL & SURGICAL HISTORY:  Congestive heart failure (CHF)    Diabetes    HTN    Hypothyroidism    INTERVAL HISTORY: lethargic, +Dyspnea, +nasal flaring.  	  MEDICATIONS:  MEDICATIONS  (STANDING):  atorvastatin 10 milliGRAM(s) Oral at bedtime  furosemide   Injectable 40 milliGRAM(s) IV Push two times a day  influenza  Vaccine (HIGH DOSE) 0.7 milliLiter(s) IntraMuscular once  insulin lispro (ADMELOG) corrective regimen sliding scale   SubCutaneous three times a day before meals  insulin lispro (ADMELOG) corrective regimen sliding scale   SubCutaneous three times a day before meals  levothyroxine 50 MICROGram(s) Oral daily  metoprolol tartrate 25 milliGRAM(s) Oral two times a day    Vitals:  T(F): 97.9 (02-15-22 @ 10:30), Max: 97.9 (02-15-22 @ 10:30)  HR: 101 (02-15-22 @ 10:30) (83 - 108)  BP: 116/80 (02-15-22 @ 10:30) (97/74 - 118/80)  RR: 17 (02-15-22 @ 10:30) (16 - 18)  SpO2: 93% (02-15-22 @ 10:30) (92% - 100%)  02-14 @ 07:01  -  02-15 @ 07:00  --------------------------------------------------------  IN:  Total IN: 0 mL    OUT:    Voided (mL): 50 mL  Total OUT: 50 mL    Total NET: -50 mL    Weight (kg): 89.9 (02-14 @ 04:38)  BMI (kg/m2): 32 (02-14 @ 04:38)    PHYSICAL EXAM:  Neuro: lethargic   CV: S1 S2 irreg irregular + sM  Lungs: Diminished bases to auscultation. + abdominal accessory muscle use.  GI: Distended, Soft, BS +, NT  Extremities: +++LE edema, and anasarca.    TELEMETRY: Atrial fibrillation  	    RADIOLOGY:     < from: Xray Chest 1 View- PORTABLE-Urgent (02.13.22 @ 21:12) >  IMPRESSION: Probable heart enlargement. Right effusion and scattered   infiltrates as above.    < end of copied text >    < from: US Abdomen Complete (US Abdomen Complete .) (02.14.22 @ 12:48) >  IMPRESSION:  1.  Large volume ascites with bilateral pleural effusions.  2.  Nodular contour of the liver suggesting cirrhosis.  3.  Thickened gallbladder wall with cholelithiasis. No other evidence of   acute cholecystitis. Gallbladder wall thickening likely dueto ascites,   low albumin state, or fluid overload.  4.  Bilateral renal cysts.  5.  Large stone in the upper pole of the RIGHT kidney measuring 2.1 cm   without evidence of hydronephrosis.  6.  Bilateral increased renal echogenicity may represent underlying   medical renal disease.  7.  Collapsed bladder with Smith in place.    < end of copied text >      DIAGNOSTIC TESTING:    [X] Echocardiogram:< from: TTE Echo Complete w/o Contrast w/ Doppler (02.14.22 @ 12:30) >  Left Ventricle: The left ventricular internal cavity size is normal. Left   ventricular wall thickness is normal.  Global LV systolic function was severely decreased. Left ventricular   ejection fraction, by visual estimation, is 30 to 35%. The   interventricular septum is flattened in diastole ('D' shaped left   ventricle), consistent with right ventricular volume overload. The left   ventricular diastolic function couldnot be assessed in this study.      LV Wall Scoring:  The basal and mid inferior wall, basal and mid inferolateral wall, mid  inferoseptal segment, and mid anterolateral segment are akinetic.    Right Ventricle: The right ventricular size is moderately enlarged. RV   wall thickness is mildly increased. RV systolic function is normal.   Findings are consistent with pulmonary hypertension.  Left Atrium: Mildly enlarged left atrium. Atrial septum bowing into left   atrium due to increased RA pressure.  Right Atrium: The right atrial pressure is moderately elevated. Severely   enlarged right atrium.  Pericardium: There is no evidence of pericardial effusion.  Mitral Valve: Structurally normal mitral valve, with normal leaflet   excursion. No evidence of mitral valve regurgitation is seen.  Tricuspid Valve: Structurally normal tricuspid valve, with normal leaflet   excursion. Severe tricuspid regurgitation is visualized. Estimated   pulmonary artery systolic pressure is 42.5 mmHg assuming a right atrial   pressure of 15 mmHg, which is consistent with mild pulmonary hypertension.  Aortic Valve: Sclerotic aortic valve with decreased opening. Peak   transaortic gradient equals 8.4 mmHg, mean transaortic gradient equals   3.2 mmHg, the calculated aortic valve area equals 1.33 cm² by the   continuity equation consistent with moderate aortic stenosis. The peak   aortic velocity was obtained from the apical view. No evidence of aortic   valve regurgitation is seen.  Pulmonic Valve: Structurally normal pulmonic valve, with normal leaflet   excursion. Mild pulmonic valve regurgitation.  Aorta: The aortic root and ascending aorta are structurally normal, with   no evidence of dilitation.  Pulmonary Artery: The main pulmonary artery is normalin size.  Venous: The inferior vena cava was dilated, with respiratory size   variation less than 50%, consistent with elevated right atrial pressure.    Summary:   1. Left ventricular ejection fraction, by visual estimation, is 30 to   35%.   2. Severely decreased global left ventricular systolic function.   3. Severely enlarged right atrium.   4. The left ventricular diastolic function could not be assessed in this   study.   5. There is moderate concentric left ventricular hypertrophy.   6. Mildly increased RV wall thickness.   7. Moderately enlarged right ventricle.   8. Mildly enlarged left atrium.   9. No evidence of mitral valve regurgitation.  10. Severe tricuspid regurgitation.  11. Sclerotic aortic valve with decreased opening.  12. Mild pulmonic valve regurgitation.  13. Estimated pulmonary artery systolic pressure is 42.5 mmHg assuming a   right atrial pressure of 15 mmHg, which is consistent with mild pulmonary   hypertension.  14. Peak transaortic gradient equals 8.4 mmHg,mean transaortic gradient   equals 3.2 mmHg, the calculated aortic valve area equals 1.33 cm² by the   continuity equation consistent with moderate aortic stenosis.  15. Basal and mid inferior wall, basal and mid inferolateral wall, mid   inferoseptal segment, and mid anterolateral segment are abnormal as   described above.    < end of copied text >    LABS:	 	    CARDIAC MARKERS:  Troponin I, High Sensitivity Result: 88.1 ng/L (02-13 @ 22:03)    15 Feb 2022 12:05    135    |  103    |  81     ----------------------------<  69     5.8     |  23     |  3.63   13 Feb 2022 22:03    135    |  102    |  71     ----------------------------<  121    5.0     |  25     |  3.02     Ca    9.5        15 Feb 2022 12:05  Mg     2.5       15 Feb 2022 12:05    TPro  x      /  Alb  2.7    /  TBili  x      /  DBili  x      /  AST  40     /  ALT  23     /  AlkPhos  x      15 Feb 2022 12:05                        14.0   7.55  )-----------( 254      ( 15 Feb 2022 08:25 )             45.8 ,                       14.3   4.62  )-----------( 266      ( 13 Feb 2022 21:26 )             46.3   proBNP: Serum Pro-Brain Natriuretic Peptide: 14849 pg/mL (02-13 @ 22:03)    PT/PTT- ( 15 Feb 2022 12:05 )   PT: 15.0 sec;  PTT: x        INR: 1.31 ratio (02-15 @ 12:05)

## 2022-02-15 NOTE — PROGRESS NOTE ADULT - ASSESSMENT
Assessment and Recommendation:     89 year old female PMH of CHF (EF reportedly 30% last year), HTN, HLD, DM and hypothyroidism who presented to hospital with dyspnea.  Patient requiring supplemental oxygen and found to have elevated trop and BNP. Cardiology was consulted for acute on chronic systolic/diastolic heart failure.    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 Given age and debility, will not pursue further risk stratification.  Continue tracking troponin serially.   BNP 91544 and lower extremity swelling and abdominal swelling   Assess urine output, on IV Lasix 40mg IVP 2 times per day.  On Metoprolol 25 mg, twice a day for BP control.   Currently no chest pain noted.  Continue bbl, I Lasix. Avoid ACEI/ARB/ARNI/ALdactone due to renal insufficiency.  Monitoring pleural effusion, may need therapeutic drainage to aid in respiration. 89 year old female PMH of CHF (EF reportedly 30% last year), HTN, HLD, DM and hypothyroidism who presented to hospital with dyspnea.    Patient requiring supplemental oxygen and found to have elevated trop and BNP.     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 Given age and debility, will not pursue further risk stratification.  Continue tracking troponin serially.   Cont telemetry   BNP 64627 and lower extremity swelling and abdominal swelling + Anasarca   on IV Lasix 40mg IVP bid, warranted diuretics, Creat up-trending, Renal following   Monitor renal function, electrolytes, daily weight   fluid and salt restriction   On Metoprolol 25 mg bid for rate control  Cont statin for now, monitor LFTs  Avoid ACEI/ARB/ARNI/Aldactone due to renal insufficiency.  Monitoring pleural effusion, may need therapeutic drainage to aid in respiration.   would add asa 81 mg daily after thoracentesis    palliative care following, Overall prognosis limited based on poor cardiac function advanced age and poor mentation. 89 year old female PMH of CHF (EF reportedly 30% last year), HTN, HLD, DM and hypothyroidism who presented to hospital with dyspnea.    Patient requiring supplemental oxygen and found to have elevated trop and BNP.     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 Given age and debility, will not pursue further risk stratification.  Continue tracking troponin serially.   Cont telemetry   BNP 14518 and lower extremity swelling and abdominal swelling + Anasarca   on IV Lasix 40mg IVP bid, warranted diuretics, Creat up-trending, Renal following   Monitor renal function, electrolytes, daily weight   fluid and salt restriction   On Metoprolol 25 mg bid for rate control  Cont statin for now, monitor LFTs  Avoid ACEI/ARB/ARNI/Aldactone due to renal insufficiency.  Monitoring pleural effusion, may need therapeutic drainage to aid in respiration.   would add asa 81 mg daily after thoracentesis    Check TSH  palliative care following, Overall prognosis limited based on poor cardiac function advanced age and poor mentation.

## 2022-02-15 NOTE — CONSULT NOTE ADULT - SUBJECTIVE AND OBJECTIVE BOX
[facility] NEPHROLOGY SERVICES, [facility]  NEPHROLOGY AND HYPERTENSION  [address]  [address]  [place] [address]  [phone]    MD SERGE THURSTON MD ANDREY GONCHARUK, MD MADHU KORRAPATI, MD YELENA ROSENBERG, MD BINNY KOSHY, MD CHRISTOPHER CAPUTO, MD EDWARD BOVER, MD      Information from chart:  "Patient is a 89y old  Female who presents with a chief complaint of dyspnea (15 Feb 2022 14:03)    HPI:  Patient is a 89F with a PMH of CHF (EF reportedly 30% last year), HTN, HLD, hypothyroidism who presents to the ED for dyspnea.    Patient currently AAOx2, unable to provide history.  Per ED staff who spoke to family members, patient has had increased dyspnea and lower extremity swelling.  No other complaints.  SpO2 currently 100% on 4L via NC.  Hypothermic in ED - 95.3, rectal.  Labs show elevated creatinine and elevated troponins.  Will admit to tele. (2022 00:39)   "    Patient poorly responsive; daughter at bedside     PAST MEDICAL & SURGICAL HISTORY:  CAD (coronary artery disease)    Diabetes mellitus, type 2    HLD (hyperlipidemia)    HTN (hypertension), benign    Hypothyroidism    Congestive heart failure (CHF)    Diabetes    Hypothyroidism    History of hysterectomy    History of cataract extraction    History of thyroidectomy, total      FAMILY HISTORY:  FH: HTN (hypertension)      Allergies    No Known Allergies    Intolerances      Home Medications:  amLODIPine:  (15 Feb 2022 14:19)  Aspirin Enteric Coated 81 mg oral delayed release tablet: 1 tab(s) orally once a day (2018 09:25)  atorvastatin:  (15 Feb 2022 14:19)  atorvastatin 40 mg oral tablet: 1 tab(s) orally once a day (2018 09:25)  furosemide 40 mg oral tablet: 1 tab(s) orally once a day (15 Feb 2022 14:19)  hydroCHLOROthiazide 25 mg oral tablet: 1 tab(s) orally once a day (2018 09:25)  Januvia 50 mg oral tablet: 1 tab(s) orally once a day (2018 09:25)  levothyroxine:  (15 Feb 2022 14:19)  levothyroxine 50 mcg (0.05 mg) oral tablet: 1 tab(s) orally once a day (2018 09:25)  metoprolol:  (15 Feb 2022 14:19)  Metoprolol Tartrate 25 mg oral tablet: 1 tab(s) orally 2 times a day (2018 09:25)  Ranexa 500 mg oral tablet, extended release: 1 tab(s) orally 2 times a day (2018 09:25)  valsartan 320 mg oral tablet: 1 tab(s) orally once a day (2018 09:25)    MEDICATIONS  (STANDING):  atorvastatin 10 milliGRAM(s) Oral at bedtime  dextrose 40% Gel 15 Gram(s) Oral once  dextrose 5%. 1000 milliLiter(s) (50 mL/Hr) IV Continuous <Continuous>  dextrose 5%. 1000 milliLiter(s) (100 mL/Hr) IV Continuous <Continuous>  dextrose 5%. 1000 milliLiter(s) (50 mL/Hr) IV Continuous <Continuous>  dextrose 50% Injectable 25 Gram(s) IV Push once  dextrose 50% Injectable 25 Gram(s) IV Push once  dextrose 50% Injectable 12.5 Gram(s) IV Push once  furosemide   Injectable 40 milliGRAM(s) IV Push two times a day  glucagon  Injectable 1 milliGRAM(s) IntraMuscular once  influenza  Vaccine (HIGH DOSE) 0.7 milliLiter(s) IntraMuscular once  insulin lispro (ADMELOG) corrective regimen sliding scale   SubCutaneous three times a day before meals  insulin lispro (ADMELOG) corrective regimen sliding scale   SubCutaneous three times a day before meals  levothyroxine 50 MICROGram(s) Oral daily  metoprolol tartrate 25 milliGRAM(s) Oral two times a day    MEDICATIONS  (PRN):    Vital Signs Last 24 Hrs  T(C): 36.1 (15 Feb 2022 16:42), Max: 36.6 (15 Feb 2022 10:30)  T(F): 97 (15 Feb 2022 16:42), Max: 97.9 (15 Feb 2022 10:30)  HR: 105 (15 Feb 2022 16:42) (83 - 108)  BP: 90/61 (15 Feb 2022 16:42) (90/61 - 118/80)  BP(mean): --  RR: 20 (15 Feb 2022 16:42) (16 - 20)  SpO2: 92% (15 Feb 2022 16:42) (92% - 100%)    Daily     Daily Weight in k.7 (15 Feb 2022 04:39)    22 @ 07:01  -  02-15-22 @ 07:00  --------------------------------------------------------  IN: 0 mL / OUT: 50 mL / NET: -50 mL      CAPILLARY BLOOD GLUCOSE      POCT Blood Glucose.: 129 mg/dL (15 Feb 2022 16:23)  POCT Blood Glucose.: 76 mg/dL (15 Feb 2022 14:01)  POCT Blood Glucose.: 67 mg/dL (15 Feb 2022 13:19)  POCT Blood Glucose.: 70 mg/dL (15 Feb 2022 11:37)  POCT Blood Glucose.: 67 mg/dL (15 Feb 2022 11:36)  POCT Blood Glucose.: 75 mg/dL (15 Feb 2022 07:40)  POCT Blood Glucose.: 96 mg/dL (2022 21:48)    PHYSICAL EXAM:      T(C): 36.1 (02-15-22 @ 16:42), Max: 36.6 (02-15-22 @ 10:30)  HR: 105 (02-15-22 @ 16:42) (83 - 108)  BP: 90/61 (02-15-22 @ 16:42) (90/61 - 118/80)  RR: 20 (02-15-22 @ 16:42) (16 - 20)  SpO2: 92% (02-15-22 @ 16:42) (92% - 100%)  Wt(kg): --  Lungs decreased BS at bases   Heart S1S2  Abd soft post paracentesis   Extremities:   2-3  edema              02-15    135  |  103  |  81<H>  ----------------------------<  69<L>  5.8<H>   |  23  |  3.63<H>    Ca    9.5      15 Feb 2022 12:05  Phos  6.6     02-15  Mg     2.5     02-15    TPro  7.1  /  Alb  2.7<L>  /  TBili  1.3<H>  /  DBili  x   /  AST  40<H>  /  ALT  23  /  AlkPhos  153<H>  02-15                          14.0   7.55  )-----------( 254      ( 15 Feb 2022 08:25 )             45.8     Creatinine Trend: 3.63<--, 3.02<--  Urinalysis Basic - ( 2022 10:39 )    Color: Yellow / Appearance: Clear / S.020 / pH: x  Gluc: x / Ketone: Trace  / Bili: Small / Urobili: 1 mg/dL   Blood: x / Protein: 100 mg/dL / Nitrite: Negative   Leuk Esterase: Small / RBC: 11-25 /HPF / WBC 11-25   Sq Epi: x / Non Sq Epi: Moderate / Bacteria: Moderate        < from: US Abdomen Complete (US Abdomen Complete .) (22 @ 12:48) >  IMPRESSION:  1.  Large volume ascites with bilateral pleural effusions.  2.  Nodular contour of the liver suggesting cirrhosis.  3.  Thickened gallbladder wall with cholelithiasis. No other evidence of   acute cholecystitis. Gallbladder wall thickening likely dueto ascites,   low albumin state, or fluid overload.  4.  Bilateral renal cysts.  5.  Large stone in the upper pole of the RIGHT kidney measuring 2.1 cm   without evidence of hydronephrosis.  6.  Bilateral increased renal echogenicity may represent underlying   medical renal disease.  7.  Collapsed bladder with Smith in place.    < end of copied text >      Assessment   MARINA CKD 3; pre renal azotemia; risk for HRS;   Post paracentesis 3000ml     Plan  Supportive care  Overall limited therapeutic options   Diuresis for comfort   Discussed with daughter at bedside;     Chirag Adrian MD

## 2022-02-15 NOTE — DIETITIAN INITIAL EVALUATION ADULT. - PERTINENT LABORATORY DATA
02-15 Na135 mmol/L Glu 69 mg/dL<L> K+ 5.8 mmol/L<H> Cr  3.63 mg/dL<H> BUN 81 mg/dL<H> 02-15 Phos 6.6 mg/dL<H> 02-15 Alb 2.7 g/dL<L>  02-15-22  A1C 6.5%; 02-14 POCT: 111, 83, 96

## 2022-02-16 NOTE — PROGRESS NOTE ADULT - PROBLEM SELECTOR PLAN 7
Patient's daughter signed DNR/I on MOLST in chart.  Patient with varying oxygen requirements, now on NRB with shallow respirations and appears to be in her dying process. Patient's daughter signed DNR/I on MOLST in chart.  Patient with varying oxygen requirements, now on NRB with shallow respirations and appears to be in her dying process. Called patient's daughter, Jaz to advise to her to come visit as it appears her mother may be in her dying process.      Discussed with Dr. Mcclellan

## 2022-02-16 NOTE — PROGRESS NOTE ADULT - ATTENDING COMMENTS
pt seen and examined with NP Korin, agree with above assessment and plan - pt is 89 year old admitted with dyspnea and volume overload poorly responsive to diuretics. Paracentesis for ascites performed yesterday without improvement in breathing distress. Pt appears to be in her dying process. Daughter Jaz came to bedside this afternoon and was tearful, she is interested in hospice care at this point and will have orders reflect comfort. Emotional and spiritual support rendered,  came to bedside. Hospice referral requested.   Dr. Mcclellan present at bedside for conversation with daughter.

## 2022-02-16 NOTE — PROGRESS NOTE ADULT - SUBJECTIVE AND OBJECTIVE BOX
[facility] NEPHROLOGY SERVICES, [facility]  NEPHROLOGY AND HYPERTENSION  [address]  [address]  [place] [address]  [phone]    MD SERGE THURSTON MD ANDREY GONCHARUK, MD MADHU KORRAPATI, MD YELENA ROSENBERG, MD BINNY KOSHY, MD CHRISTOPHER CAPUTO, MD LUIS ROSE MD          Patient events noted    MEDICATIONS  (STANDING):  dextrose 40% Gel 15 Gram(s) Oral once  dextrose 5%. 1000 milliLiter(s) (100 mL/Hr) IV Continuous <Continuous>  dextrose 5%. 1000 milliLiter(s) (50 mL/Hr) IV Continuous <Continuous>  dextrose 50% Injectable 25 Gram(s) IV Push once  dextrose 50% Injectable 25 Gram(s) IV Push once  dextrose 50% Injectable 12.5 Gram(s) IV Push once  furosemide   Injectable 40 milliGRAM(s) IV Push two times a day  glucagon  Injectable 1 milliGRAM(s) IntraMuscular once  HYDROmorphone  Injectable 0.5 milliGRAM(s) IV Push every 6 hours  influenza  Vaccine (HIGH DOSE) 0.7 milliLiter(s) IntraMuscular once  insulin lispro (ADMELOG) corrective regimen sliding scale   SubCutaneous three times a day before meals  insulin lispro (ADMELOG) corrective regimen sliding scale   SubCutaneous three times a day before meals    MEDICATIONS  (PRN):  HYDROmorphone  Injectable 1 milliGRAM(s) IV Push every 1 hour PRN dyspnea or severe pain      02-16-22 @ 07:01  -  02-17-22 @ 00:44  --------------------------------------------------------  IN: 0 mL / OUT: 0 mL / NET: 0 mL      PHYSICAL EXAM:      T(C): 37.2 (02-17-22 @ 00:31), Max: 37.2 (02-16-22 @ 20:11)  HR: 100 (02-17-22 @ 00:31) (74 - 108)  BP: 124/80 (02-17-22 @ 00:31) (99/68 - 128/93)  RR: 19 (02-17-22 @ 00:31) (19 - 20)  SpO2: 97% (02-17-22 @ 00:31) (92% - 100%)  Wt(kg): --  Lungs clear  Heart S1S2  Abd soft NT ND  Extremities:   2 edema                                    14.0   7.55  )-----------( 254      ( 15 Feb 2022 08:25 )             45.8     02-16    134<L>  |  103  |  89<H>  ----------------------------<  130<H>  6.1<H>   |  22  |  4.11<H>    Ca    8.8      16 Feb 2022 12:22  Phos  6.5     02-16  Mg     2.6     02-16    TPro  7.1  /  Alb  2.7<L>  /  TBili  1.3<H>  /  DBili  x   /  AST  40<H>  /  ALT  23  /  AlkPhos  153<H>  02-15      LIVER FUNCTIONS - ( 15 Feb 2022 12:05 )  Alb: 2.7 g/dL / Pro: 7.1 gm/dL / ALK PHOS: 153 U/L / ALT: 23 U/L / AST: 40 U/L / GGT: x           Creatinine Trend: 4.11<--, 3.63<--, 3.02<--      Assessment   MARINA CKD 3; pre renal azotemia; risk for HRS;   Post paracentesis 3000ml     Plan  Supportive care  Overall limited therapeutic options       Chirag Adrian MD

## 2022-02-16 NOTE — PROGRESS NOTE ADULT - SUBJECTIVE AND OBJECTIVE BOX
SUBJECTIVE AND OBJECTIVE: Patient on NRB, opens eyes to voice at times  INTERVAL HPI/OVERNIGHT EVENTS:    DNR on chart: yes  Allergies    No Known Allergies    Intolerances    MEDICATIONS  (STANDING):  dextrose 40% Gel 15 Gram(s) Oral once  dextrose 5%. 1000 milliLiter(s) (100 mL/Hr) IV Continuous <Continuous>  dextrose 5%. 1000 milliLiter(s) (50 mL/Hr) IV Continuous <Continuous>  dextrose 50% Injectable 25 Gram(s) IV Push once  dextrose 50% Injectable 25 Gram(s) IV Push once  dextrose 50% Injectable 12.5 Gram(s) IV Push once  furosemide   Injectable 40 milliGRAM(s) IV Push two times a day  glucagon  Injectable 1 milliGRAM(s) IntraMuscular once  influenza  Vaccine (HIGH DOSE) 0.7 milliLiter(s) IntraMuscular once  insulin lispro (ADMELOG) corrective regimen sliding scale   SubCutaneous three times a day before meals  insulin lispro (ADMELOG) corrective regimen sliding scale   SubCutaneous three times a day before meals    MEDICATIONS  (PRN):      ITEMS UNCHECKED ARE NOT PRESENT    PRESENT SYMPTOMS: [ ]Unable to obtain due to poor mentation   Source if other than patient:  [ ]Family   [ ]Team     Pain:  [ ]yes [x ]no  QOL impact -   Location -                    Aggravating factors -  Quality -  Radiation -  Timing-  Severity (0-10 scale):  Minimal acceptable level (0-10 scale):     Dyspnea:                           [ ]Mild [ ]Moderate [ ]Severe  Anxiety:                             [ ]Mild [ ]Moderate [ ]Severe  Fatigue:                             [ ]Mild [ ]Moderate [ ]Severe  Nausea:                             [ ]Mild [ ]Moderate [ ]Severe  Loss of appetite:              [ ]Mild [ ]Moderate [ ]Severe  Constipation:                    [ ]Mild [ ]Moderate [ ]Severe    PAIN AD Score:	1  http://geriatrictoolkit.[place].[email or web address]/painad.pdf (Ctrl + left click to view)    Other Symptoms:  [ ]All other review of systems negative     Palliative Performance Status Version 2:        10 %      http://npcrc.org/files/news/palliative_performance_scale_ppsv2.pdf  PHYSICAL EXAM:  Vital Signs Last 24 Hrs  T(C): 35.7 (16 Feb 2022 09:12), Max: 36.7 (16 Feb 2022 04:02)  T(F): 96.3 (16 Feb 2022 09:12), Max: 98 (16 Feb 2022 04:02)  HR: 74 (16 Feb 2022 09:12) (74 - 105)  BP: 108/63 (16 Feb 2022 09:12) (90/61 - 108/63)  BP(mean): --  RR: 20 (16 Feb 2022 09:12) (20 - 22)  SpO2: 100% (16 Feb 2022 09:12) (88% - 100%) I&O's Summary     GENERAL:  [ ]Alert  [ ]Oriented x   [ x]Lethargic  [ ]Cachexia  [ ]Unarousable  [ ]Verbal  [x ]Non-Verbal shook head indicating "no" when asked if she was in pain  Behavioral:   [ ]Anxiety  [ ]Delirium [ ]Agitation [ ]Other  HEENT:  [ ]Normal   [ x]Dry mouth   [ ]ET Tube/Trach  [ ]Oral lesions  PULMONARY:   [x ]Clear left side  [ ]Tachypnea  [ ]Audible excessive secretions   [ ]Rhonchi        [ ]Right [ ]Left [ ]Bilateral  [ ]Crackles        [ ]Right [ ]Left [ ]Bilateral  [ ]Wheezing     [ ]Right [ ]Left [ ]Bilateral  [ x]Diminished BS [x ] Right [ ]Left [ ]Bilateral  CARDIOVASCULAR:    [x ]Regular [ ]Irregular [ ]Tachy  [ ]Sergio [ ]Murmur [ ]Other  GASTROINTESTINAL:  [ ]Soft  [ x]Distended   [x ]+BS  [ ]Non tender [ ]Tender  [ ]PEG [ ]OGT/ NGT   Last BM:    GENITOURINARY:  [ ]Normal [ ]Incontinent   [ ]Oliguria/Anuria   [x ]Smith  MUSCULOSKELETAL:   [ ]Normal   [ ]Weakness  [ x]Bed/Wheelchair bound [x ]Edema bilateral arms  NEUROLOGIC:   [ ]No focal deficits  [x ] Cognitive impairment  [ ] Dysphagia [ ]Dysarthria [ ] Paresis [ ]Other   SKIN:   [ ]Normal  [ ]Rash   [ ]Pressure ulcer(s) [ ]y [ ]n present on admission  left iliac crest region skin tear     CRITICAL CARE:  [ ]Shock Present  [ ]Septic [ ]Cardiogenic [ ]Neurologic [ ]Hypovolemic  [ ]Vasopressors [ ]Inotropes  [ ]Respiratory failure present [ ]Mechanical Ventilation [ ]Non-invasive ventilatory support [ ]High-Flow  [ ]Acute  [ ]Chronic [ ]Hypoxic  [ ]Hypercarbic [ ]Other  [ ]Other organ failure     LABS:                        14.0   7.55  )-----------( 254      ( 15 Feb 2022 08:25 )             45.8   02-15    135  |  103  |  81<H>  ----------------------------<  69<L>  5.8<H>   |  23  |  3.63<H>    Ca    9.5      15 Feb 2022 12:05  Phos  6.6     02-15  Mg     2.5     02-15    TPro  7.1  /  Alb  2.7<L>  /  TBili  1.3<H>  /  DBili  x   /  AST  40<H>  /  ALT  23  /  AlkPhos  153<H>  02-15  PT/INR - ( 15 Feb 2022 12:05 )   PT: 15.0 sec;   INR: 1.31 ratio       RADIOLOGY & ADDITIONAL STUDIES:  < from: US Abdomen Complete (US Abdomen Complete .) (02.14.22 @ 12:48) >  ACC: [id number] EXAM:  US ABDOMEN COMPLETE                        PROCEDURE DATE:  02/14/2022    INTERPRETATION:  CLINICAL INFORMATION: Ascites follow-up. Distended   bladder with little output from Smith  COMPARISON: None available.  TECHNIQUE: Sonography of the abdomen.  FINDINGS:  Liver: Heterogeneous echotexture with nodular contour suggesting   cirrhosis. No hepatic biliary dilatation.  Bile ducts: Normal caliber. Common bile duct measures 3 mm.  Gallbladder: Multiple gallstones. Thickened gallbladder wall measuring 7   mm.  Pancreas: Poorly visualized.  Spleen: 6.8 cm. Within normal limits.  Right kidney: 8.6 cm. Multiple renal cysts with the largest measuring 2.0   x 2.0 cm. Echogenic calcification measuring 2.1 cm within the upper pole   the RIGHT kidney. Possible partial staghorn. 4 mm nonobstructing calculus   in the lower pole of the RIGHT kidney.. Increased echogenicity.  Left kidney: 8.1 cm.  Multiple renal cysts with the largest measuring 5.9   x 5.4 cm within the interpolar region.. Increased echogenicity.  Ascites: Large volume ascites.  Bladder: Collapsed with Smith catheter in place.  Aorta and IVC: Calcifications of the aorta, atherosclerosis.  Lung bases: Bilateral pleural effusions.    IMPRESSION:  1.  Large volume ascites with bilateral pleural effusions.  2.  Nodular contour of the liver suggesting cirrhosis.  3.  Thickened gallbladder wall with cholelithiasis. No other evidence of   acute cholecystitis. Gallbladder wall thickening likely dueto ascites,   low albumin state, or fluid overload.  4.  Bilateral renal cysts.  5.  Large stone in the upper pole of the RIGHT kidney measuring 2.1 cm   without evidence of hydronephrosis.  6.  Bilateral increased renal echogenicity may represent underlying   medical renal disease.  7.  Collapsed bladder with Smith in place.    --- End of Report ---  < end of copied text >        Protein Calorie Malnutrition Present: [ ]mild [x ]moderate [ ]severe [ ]underweight [ ]morbid obesity  https://www.andeal.org/vault/2440/web/files/ONC/Table_Clinical%20Characteristics%20to%20Document%20Malnutrition-White%20JV%20et%20al%165430.pdf    Height (cm): 167.6 (02-13-22 @ 20:39)  Weight (kg): 89.9 (02-14-22 @ 04:38)  BMI (kg/m2): 32 (02-14-22 @ 04:38)    [ ]PPSV2 < or = 30%  [ ]significant weight loss [ ]poor nutritional intake [ ]anasarca    [ ]Artificial Nutrition    REFERRALS:   [ ]Chaplaincy  [ ]Hospice  [ ]Child Life  [ ]Social Work  [ ]Case management [ ]Holistic Therapy     Goals of Care Document:

## 2022-02-16 NOTE — PROGRESS NOTE ADULT - ASSESSMENT
· Assessment	  89 year old female PMH of CHF (EF reportedly 30% last year), HTN, HLD, DM and hypothyroidism who presented to hospital with dyspnea.    Patient requiring supplemental oxygen and found to have elevated trop and BNP.     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   ·	Likely type II MI in setting of heart failure and renal insufficiency. Given age and debility, will not pursue further risk stratification.  ·	BNP 76037 and lower extremity swelling and abdominal swelling + Anasarca   ·	Continue tracking troponin serially.   ·	Continue IV Lasix 40mg IVP bid, warranted diuretics, Creat up-trending, Renal following   ·	Monitor renal function, electrolytes, daily weight   ·	fluid and salt restriction   ·	Continue Metoprolol 25 mg bid for rate control  ·	Continue statin for now, monitor LFTs  ·	Avoid ACEI/ARB/ARNI/Aldactone due to renal insufficiency.  ·	TSH: 10.4. Order T4 to r/o hypothyroidism.  ·	Telemetry D/C'ed   ·	Patient is DNR  ·	Monitoring pleural effusion, may need therapeutic drainage to aid in respiration. would add asa 81 mg daily after thoracentesis    ·	Palliative care following, Overall prognosis limited based on poor cardiac function advanced age and poor mentation. 	  89 year old female PMH of CHF (EF reportedly 30% last year), HTN, HLD, DM and hypothyroidism who presented to hospital with dyspnea.    Patient requiring supplemental oxygen and found to have elevated trop and BNP.     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   ·	Likely type II MI in setting of heart failure and renal insufficiency. Given age and debility, will not pursue further risk stratification.  ·	Cont diuretics for comfort, Creat up-trending, Renal following   ·	Monitor renal function, electrolytes  ·	Off all oral meds, pt lethargic, unable to swallow, no bbl 2/2 low BP  ·	Not a candidate for full AC, can add asa if medical Rx to be continued, currently family requesting hospice referral.  ·	No ACEI/ARB/ARNI/Aldactone due to renal insufficiency.  ·	Patient is DNR, Palliative care following, On low dose opioids for dyspnea relief  ·	will only follow PRN

## 2022-02-16 NOTE — PROGRESS NOTE ADULT - ASSESSMENT
89 year old female PMH of heart failure, DM and hypothyroidism presented to hospital with dyspnea.  Patient requiring supplemental oxygen and found to have elevated trop and BNP.  Palliative care consulted for GOC.

## 2022-02-16 NOTE — PROGRESS NOTE ADULT - SUBJECTIVE AND OBJECTIVE BOX
Patient is a 89y old  Female who presents with a chief complaint of dyspnea. (16 Feb 2022 11:19)    PAST MEDICAL & SURGICAL HISTORY:  CAD (coronary artery disease)    Diabetes mellitus, type 2    HLD (hyperlipidemia)    HTN (hypertension), benign    Hypothyroidism    Congestive heart failure (CHF)    Diabetes    Hypothyroidism    History of hysterectomy    History of cataract extraction    History of thyroidectomy, total    INTERVAL HISTORY: Patient is still lethargic, breathing better on NRB.   	  MEDICATIONS:  MEDICATIONS  (STANDING):  furosemide   Injectable 40 milliGRAM(s) IV Push two times a day  glucagon  Injectable 1 milliGRAM(s) IntraMuscular once  influenza  Vaccine (HIGH DOSE) 0.7 milliLiter(s) IntraMuscular once  insulin lispro (ADMELOG) corrective regimen sliding scale   SubCutaneous three times a day before meals    Vitals:  T(F): 96.3 (02-16-22 @ 09:12), Max: 98 (02-16-22 @ 04:02)  HR: 74 (02-16-22 @ 09:12) (74 - 105)  BP: 108/63 (02-16-22 @ 09:12) (90/61 - 108/63)  RR: 20 (02-16-22 @ 09:12) (20 - 22)  SpO2: 100% (02-16-22 @ 09:12) (88% - 100%)  Weight (kg): 89.9 (02-14 @ 04:38)  BMI (kg/m2): 32 (02-14 @ 04:38)    PHYSICAL EXAM:  Neuro: Lethargic, in no acute distress  CV: S1 S2 RRR  Lungs: Diminished bases to ausculation. Breathing comfortably on NRB.  GI: Soft, distended, BS +, NT  Extremities: +Edematous in all upper and lower extremities, and anasarca.    TELEMETRY: Afib to 90's   RADIOLOGY:   < from: Xray Chest 1 View- PORTABLE-Urgent (02.13.22 @ 21:12) >  IMPRESSION: Probable heart enlargement. Right effusion and scattered   infiltrates as above.    < end of copied text >    < from: US Abdomen Complete (US Abdomen Complete .) (02.14.22 @ 12:48) >  IMPRESSION:  1.  Large volume ascites with bilateral pleural effusions.  2.  Nodular contour of the liver suggesting cirrhosis.  3.  Thickened gallbladder wall with cholelithiasis. No other evidence of   acute cholecystitis. Gallbladder wall thickening likely dueto ascites,   low albumin state, or fluid overload.  4.  Bilateral renal cysts.  5.  Large stone in the upper pole of the RIGHT kidney measuring 2.1 cm   without evidence of hydronephrosis.  6.  Bilateral increased renal echogenicity may represent underlying   medical renal disease.  7.  Collapsed bladder with Smith in place.    < end of copied text >    < from: US Paracentesis (02.15.22 @ 15:48) >  Impression: Successful sonographic guided paracentesis.    < end of copied text >    DIAGNOSTIC TESTING:    [X] Echocardiogram: < from: TTE Echo Complete w/o Contrast w/ Doppler (02.14.22 @ 12:30) >  Left Ventricle: The left ventricular internal cavity size is normal. Left   ventricular wall thickness is normal.  Global LV systolic function was severely decreased. Left ventricular   ejection fraction, by visual estimation, is 30 to 35%. The   interventricular septum is flattened in diastole ('D' shaped left   ventricle), consistent with right ventricular volume overload. The left   ventricular diastolic function couldnot be assessed in this study.      LV Wall Scoring:  The basal and mid inferior wall, basal and mid inferolateral wall, mid  inferoseptal segment, and mid anterolateral segment are akinetic.    Right Ventricle: The right ventricular size is moderately enlarged. RV   wall thickness is mildly increased. RV systolic function is normal.   Findings are consistent with pulmonary hypertension.  Left Atrium: Mildly enlarged left atrium. Atrial septum bowing into left   atrium due to increased RA pressure.  Right Atrium: The right atrial pressure is moderately elevated. Severely   enlarged right atrium.  Pericardium: There is no evidence of pericardial effusion.  Mitral Valve: Structurally normal mitral valve, with normal leaflet   excursion. No evidence of mitral valve regurgitation is seen.  Tricuspid Valve: Structurally normal tricuspid valve, with normal leaflet   excursion. Severe tricuspid regurgitation is visualized. Estimated   pulmonary artery systolic pressure is 42.5 mmHg assuming a right atrial   pressure of 15 mmHg, which is consistent with mild pulmonary hypertension.  Aortic Valve: Sclerotic aortic valve with decreased opening. Peak   transaortic gradient equals 8.4 mmHg, mean transaortic gradient equals   3.2 mmHg, the calculated aortic valve area equals 1.33 cm² by the   continuity equation consistent with moderate aortic stenosis. The peak   aortic velocity was obtained from the apical view. No evidence of aortic   valve regurgitation is seen.  Pulmonic Valve: Structurally normal pulmonic valve, with normal leaflet   excursion. Mild pulmonic valve regurgitation.  Aorta: The aortic root and ascending aorta are structurally normal, with   no evidence of dilitation.  Pulmonary Artery: The main pulmonary artery is normalin size.  Venous: The inferior vena cava was dilated, with respiratory size   variation less than 50%, consistent with elevated right atrial pressure.    Summary:   1. Left ventricular ejection fraction, by visual estimation, is 30 to   35%.   2. Severely decreased global left ventricular systolic function.   3. Severely enlarged right atrium.   4. The left ventricular diastolic function could not be assessed in this   study.   5. There is moderate concentric left ventricular hypertrophy.   6. Mildly increased RV wall thickness.   7. Moderately enlarged right ventricle.   8. Mildly enlarged left atrium.   9. No evidence of mitral valve regurgitation.  10. Severe tricuspid regurgitation.  11. Sclerotic aortic valve with decreased opening.  12. Mild pulmonic valve regurgitation.  13. Estimated pulmonary artery systolic pressure is 42.5 mmHg assuming a   right atrial pressure of 15 mmHg, which is consistent with mild pulmonary   hypertension.  14. Peak transaortic gradient equals 8.4 mmHg,mean transaortic gradient   equals 3.2 mmHg, the calculated aortic valve area equals 1.33 cm² by the   continuity equation consistent with moderate aortic stenosis.  15. Basal and mid inferior wall, basal and mid inferolateral wall, mid   inferoseptal segment, and mid anterolateral segment are abnormal as   described above.    < end of copied text >    LABS:	 	  CARDIAC MARKERS:  Troponin I, High Sensitivity Result: 95.8 ng/L (02-15 @ 12:05)  Troponin I, High Sensitivity Result: 88.1 ng/L (02-13 @ 22:03)    15 Feb 2022 12:05    135    |  103    |  81     ----------------------------<  69     5.8     |  23     |  3.63   13 Feb 2022 22:03    135    |  102    |  71     ----------------------------<  121    5.0     |  25     |  3.02     Ca    9.5        15 Feb 2022 12:05  Phos  6.6       15 Feb 2022 12:05  Mg     2.5       15 Feb 2022 12:05    TPro  7.1    /  Alb  2.7    /  TBili  1.3    /  DBili  x      /  AST  40     /  ALT  23     /  AlkPhos  153    15 Feb 2022 12:05                        14.0   7.55  )-----------( 254      ( 15 Feb 2022 08:25 )             45.8 ,                       14.3   4.62  )-----------( 266      ( 13 Feb 2022 21:26 )             46.3   proBNP: Serum Pro-Brain Natriuretic Peptide: 94624 pg/mL (02-13 @ 22:03)    TSH: Thyroid Stimulating Hormone, Serum: 10.400 uIU/mL (02-15 @ 12:05)    PT/PTT- ( 15 Feb 2022 12:05 )   PT: 15.0 sec;  PTT: x        INR: 1.31 ratio (02-15 @ 12:05) Patient is a 89y old  Female who presents with a chief complaint of dyspnea. (16 Feb 2022 11:19)    PAST MEDICAL & SURGICAL HISTORY:  CAD (coronary artery disease)    Diabetes mellitus, type 2    HLD (hyperlipidemia)    HTN (hypertension), benign    Hypothyroidism    Congestive heart failure (CHF)    Diabetes    Hypothyroidism    History of hysterectomy    History of cataract extraction    History of thyroidectomy, total    INTERVAL HISTORY: Patient is still lethargic, + dyspnea    	  MEDICATIONS:  MEDICATIONS  (STANDING):  furosemide   Injectable 40 milliGRAM(s) IV Push two times a day  glucagon  Injectable 1 milliGRAM(s) IntraMuscular once  influenza  Vaccine (HIGH DOSE) 0.7 milliLiter(s) IntraMuscular once  insulin lispro (ADMELOG) corrective regimen sliding scale   SubCutaneous three times a day before meals    Vitals:  T(F): 96.3 (02-16-22 @ 09:12), Max: 98 (02-16-22 @ 04:02)  HR: 74 (02-16-22 @ 09:12) (74 - 105)  BP: 108/63 (02-16-22 @ 09:12) (90/61 - 108/63)  RR: 20 (02-16-22 @ 09:12) (20 - 22)  SpO2: 100% (02-16-22 @ 09:12) (88% - 100%)  Weight (kg): 89.9 (02-14 @ 04:38)  BMI (kg/m2): 32 (02-14 @ 04:38)    PHYSICAL EXAM:  Neuro: Lethargic  CV: S1 S2 RRR + SM  Lungs: Diminished bases to ausculation. + dyspnea  GI: Soft, distended, BS +, NT  Extremities: +Edematous in all upper and lower extremities, and anasarca.    TELEMETRY: Afib to 90's   RADIOLOGY:   < from: Xray Chest 1 View- PORTABLE-Urgent (02.13.22 @ 21:12) >  IMPRESSION: Probable heart enlargement. Right effusion and scattered   infiltrates as above.    < end of copied text >    < from: US Abdomen Complete (US Abdomen Complete .) (02.14.22 @ 12:48) >  IMPRESSION:  1.  Large volume ascites with bilateral pleural effusions.  2.  Nodular contour of the liver suggesting cirrhosis.  3.  Thickened gallbladder wall with cholelithiasis. No other evidence of   acute cholecystitis. Gallbladder wall thickening likely dueto ascites,   low albumin state, or fluid overload.  4.  Bilateral renal cysts.  5.  Large stone in the upper pole of the RIGHT kidney measuring 2.1 cm   without evidence of hydronephrosis.  6.  Bilateral increased renal echogenicity may represent underlying   medical renal disease.  7.  Collapsed bladder with Smith in place.    < end of copied text >    < from: US Paracentesis (02.15.22 @ 15:48) >  Impression: Successful sonographic guided paracentesis.    < end of copied text >    DIAGNOSTIC TESTING:    [X] Echocardiogram: < from: TTE Echo Complete w/o Contrast w/ Doppler (02.14.22 @ 12:30) >  Left Ventricle: The left ventricular internal cavity size is normal. Left   ventricular wall thickness is normal.  Global LV systolic function was severely decreased. Left ventricular   ejection fraction, by visual estimation, is 30 to 35%. The   interventricular septum is flattened in diastole ('D' shaped left   ventricle), consistent with right ventricular volume overload. The left   ventricular diastolic function couldnot be assessed in this study.      LV Wall Scoring:  The basal and mid inferior wall, basal and mid inferolateral wall, mid  inferoseptal segment, and mid anterolateral segment are akinetic.    Right Ventricle: The right ventricular size is moderately enlarged. RV   wall thickness is mildly increased. RV systolic function is normal.   Findings are consistent with pulmonary hypertension.  Left Atrium: Mildly enlarged left atrium. Atrial septum bowing into left   atrium due to increased RA pressure.  Right Atrium: The right atrial pressure is moderately elevated. Severely   enlarged right atrium.  Pericardium: There is no evidence of pericardial effusion.  Mitral Valve: Structurally normal mitral valve, with normal leaflet   excursion. No evidence of mitral valve regurgitation is seen.  Tricuspid Valve: Structurally normal tricuspid valve, with normal leaflet   excursion. Severe tricuspid regurgitation is visualized. Estimated   pulmonary artery systolic pressure is 42.5 mmHg assuming a right atrial   pressure of 15 mmHg, which is consistent with mild pulmonary hypertension.  Aortic Valve: Sclerotic aortic valve with decreased opening. Peak   transaortic gradient equals 8.4 mmHg, mean transaortic gradient equals   3.2 mmHg, the calculated aortic valve area equals 1.33 cm² by the   continuity equation consistent with moderate aortic stenosis. The peak   aortic velocity was obtained from the apical view. No evidence of aortic   valve regurgitation is seen.  Pulmonic Valve: Structurally normal pulmonic valve, with normal leaflet   excursion. Mild pulmonic valve regurgitation.  Aorta: The aortic root and ascending aorta are structurally normal, with   no evidence of dilitation.  Pulmonary Artery: The main pulmonary artery is normalin size.  Venous: The inferior vena cava was dilated, with respiratory size   variation less than 50%, consistent with elevated right atrial pressure.    Summary:   1. Left ventricular ejection fraction, by visual estimation, is 30 to   35%.   2. Severely decreased global left ventricular systolic function.   3. Severely enlarged right atrium.   4. The left ventricular diastolic function could not be assessed in this   study.   5. There is moderate concentric left ventricular hypertrophy.   6. Mildly increased RV wall thickness.   7. Moderately enlarged right ventricle.   8. Mildly enlarged left atrium.   9. No evidence of mitral valve regurgitation.  10. Severe tricuspid regurgitation.  11. Sclerotic aortic valve with decreased opening.  12. Mild pulmonic valve regurgitation.  13. Estimated pulmonary artery systolic pressure is 42.5 mmHg assuming a   right atrial pressure of 15 mmHg, which is consistent with mild pulmonary   hypertension.  14. Peak transaortic gradient equals 8.4 mmHg,mean transaortic gradient   equals 3.2 mmHg, the calculated aortic valve area equals 1.33 cm² by the   continuity equation consistent with moderate aortic stenosis.  15. Basal and mid inferior wall, basal and mid inferolateral wall, mid   inferoseptal segment, and mid anterolateral segment are abnormal as   described above.    < end of copied text >    LABS:	 	  CARDIAC MARKERS:  Troponin I, High Sensitivity Result: 95.8 ng/L (02-15 @ 12:05)  Troponin I, High Sensitivity Result: 88.1 ng/L (02-13 @ 22:03)    15 Feb 2022 12:05    135    |  103    |  81     ----------------------------<  69     5.8     |  23     |  3.63   13 Feb 2022 22:03    135    |  102    |  71     ----------------------------<  121    5.0     |  25     |  3.02     Ca    9.5        15 Feb 2022 12:05  Phos  6.6       15 Feb 2022 12:05  Mg     2.5       15 Feb 2022 12:05    TPro  7.1    /  Alb  2.7    /  TBili  1.3    /  DBili  x      /  AST  40     /  ALT  23     /  AlkPhos  153    15 Feb 2022 12:05                        14.0   7.55  )-----------( 254      ( 15 Feb 2022 08:25 )             45.8 ,                       14.3   4.62  )-----------( 266      ( 13 Feb 2022 21:26 )             46.3   proBNP: Serum Pro-Brain Natriuretic Peptide: 06438 pg/mL (02-13 @ 22:03)    TSH: Thyroid Stimulating Hormone, Serum: 10.400 uIU/mL (02-15 @ 12:05)    PT/PTT- ( 15 Feb 2022 12:05 )   PT: 15.0 sec;  PTT: x        INR: 1.31 ratio (02-15 @ 12:05)

## 2022-02-16 NOTE — PROGRESS NOTE ADULT - SUBJECTIVE AND OBJECTIVE BOX
GANESH MOISE  MRN-[id number]    Interval History: The pt was seen and examined earlier, no distress, lethargic, pt's family present. The pt is afebrile, on NRB, no new lab work, no wBC yesterday.     PAST MEDICAL & SURGICAL HISTORY:  CAD (coronary artery disease)    Diabetes mellitus, type 2    HLD (hyperlipidemia)    HTN (hypertension), benign    Hypothyroidism    Congestive heart failure (CHF)    Diabetes    Hypothyroidism    History of hysterectomy    History of cataract extraction    History of thyroidectomy, total        ROS:    [x ] Unobtainable because: pt is lethargic, unable to answer any of my questions, does not follow commands.   [ ] All other systems negative    Constitutional: no fever, no chills  Head: no trauma  Eyes: no vision changes, no eye pain  ENT:  no sore throat, no rhinorrhea  Cardiovascular:  no chest pain, no palpitation  Respiratory:  no SOB, no cough  GI:  no abd pain, no vomiting, no diarrhea  urinary: no dysuria, no hematuria, no flank pain  musculoskeletal:  no joint pain, no joint swelling  skin:  no rash  neurology:  no headache, no seizure, no change in mental status  psych: no anxiety, no depression         Allergies  No Known Allergies        ANTIMICROBIALS:      OTHER MEDS:  dextrose 40% Gel 15 Gram(s) Oral once  dextrose 5%. 1000 milliLiter(s) IV Continuous <Continuous>  dextrose 5%. 1000 milliLiter(s) IV Continuous <Continuous>  dextrose 50% Injectable 25 Gram(s) IV Push once  dextrose 50% Injectable 25 Gram(s) IV Push once  dextrose 50% Injectable 12.5 Gram(s) IV Push once  furosemide   Injectable 40 milliGRAM(s) IV Push two times a day  glucagon  Injectable 1 milliGRAM(s) IntraMuscular once  HYDROmorphone  Injectable 0.5 milliGRAM(s) IV Push every 6 hours  HYDROmorphone  Injectable 1 milliGRAM(s) IV Push every 1 hour PRN  influenza  Vaccine (HIGH DOSE) 0.7 milliLiter(s) IntraMuscular once  insulin lispro (ADMELOG) corrective regimen sliding scale   SubCutaneous three times a day before meals  insulin lispro (ADMELOG) corrective regimen sliding scale   SubCutaneous three times a day before meals      Vital Signs Last 24 Hrs  T(C): 37 (16 Feb 2022 16:21), Max: 37.1 (16 Feb 2022 15:09)  T(F): 98.6 (16 Feb 2022 16:21), Max: 98.7 (16 Feb 2022 15:09)  HR: 102 (16 Feb 2022 16:21) (74 - 102)  BP: 128/93 (16 Feb 2022 16:21) (103/64 - 128/93)  BP(mean): --  RR: 20 (16 Feb 2022 16:21) (20 - 22)  SpO2: 92% (16 Feb 2022 16:21) (88% - 100%)    Physical Exam:  Constitutional: ill appearing female on NRB  HEAD/EYES: anicteric, no conjunctival injection  ENT:  supple, unable to assess pt's mouth due to non-compliance   Cardiovascular:   S1, S2, + murmur, severe 3+ edema/anasarca  Respiratory:  overall diminished but there are crackles  bilaterally, no wheezes  GI:  soft, non-tender, normal bowel sounds  :  +butler  Neurologic: lethargic, doesn't follow commands  Skin:  no rash, no erythema, no phlebitis  Heme/Onc: no lymphadenopathy   Psychiatric: unable to assess     WBC Count: 7.55 K/uL (02-15 @ 08:25)  WBC Count: 4.62 K/uL (02-13 @ 21:26)                            14.0   7.55  )-----------( 254      ( 15 Feb 2022 08:25 )             45.8       02-16    134<L>  |  103  |  89<H>  ----------------------------<  130<H>  6.1<H>   |  22  |  4.11<H>    Ca    8.8      16 Feb 2022 12:22  Phos  6.5     02-16  Mg     2.6     02-16    TPro  7.1  /  Alb  2.7<L>  /  TBili  1.3<H>  /  DBili  x   /  AST  40<H>  /  ALT  23  /  AlkPhos  153<H>  02-15          Creatinine Trend: 4.11<--, 3.63<--, 3.02<--      MICROBIOLOGY:  v  .Blood Blood  02-15-22   No growth to date.  --  --    Rapid RVP Result: NotDetec (02-13 @ 21:26)    Ferritin, Serum: 306 (02-16)    D-Dimer Assay, Quantitative: 2721 (02-13)    SARS-CoV-2: NotDetec (02-13-22 @ 21:26)  Rapid RVP Result: NotDetec (02-13-22 @ 21:26)    SARS-CoV-2: NotDetec (13 Feb 2022 21:26)    RADIOLOGY:

## 2022-02-16 NOTE — PROGRESS NOTE ADULT - SUBJECTIVE AND OBJECTIVE BOX
Patient is a 89y old  Female who presents with a chief complaint of dyspnea (16 Feb 2022 17:19)      HPI:  Patient is a 89F with a PMH of CHF (EF reportedly 30% last year), HTN, HLD, hypothyroidism who presents to the ED for dyspnea.    Patient currently AAOx2, unable to provide history.  Per ED staff who spoke to family members, patient has had increased dyspnea and lower extremity swelling.  No other complaints.  SpO2 currently 100% on 4L via NC.  Hypothermic in ED - 95.3, rectal.  Labs show elevated creatinine and elevated troponins.  Will admit to tele. (14 Feb 2022 00:39)      INTERVAL HPI/OVERNIGHT EVENTS: Patient seen earlier today.  The patient denies melena, hematochezia, hematemesis, nausea, vomiting, abdominal pain, constipation, diarrhea, or change in bowel movements S/P Paracentesis  ( 3 liters )     MEDICATIONS  (STANDING):  dextrose 40% Gel 15 Gram(s) Oral once  dextrose 5%. 1000 milliLiter(s) (100 mL/Hr) IV Continuous <Continuous>  dextrose 5%. 1000 milliLiter(s) (50 mL/Hr) IV Continuous <Continuous>  dextrose 50% Injectable 25 Gram(s) IV Push once  dextrose 50% Injectable 25 Gram(s) IV Push once  dextrose 50% Injectable 12.5 Gram(s) IV Push once  furosemide   Injectable 40 milliGRAM(s) IV Push two times a day  glucagon  Injectable 1 milliGRAM(s) IntraMuscular once  HYDROmorphone  Injectable 0.5 milliGRAM(s) IV Push every 6 hours  influenza  Vaccine (HIGH DOSE) 0.7 milliLiter(s) IntraMuscular once  insulin lispro (ADMELOG) corrective regimen sliding scale   SubCutaneous three times a day before meals  insulin lispro (ADMELOG) corrective regimen sliding scale   SubCutaneous three times a day before meals    MEDICATIONS  (PRN):  HYDROmorphone  Injectable 1 milliGRAM(s) IV Push every 1 hour PRN dyspnea or severe pain      FAMILY HISTORY:  FH: HTN (hypertension)        Allergies    No Known Allergies    Intolerances        PMH/PSH:  CAD (coronary artery disease)    Diabetes mellitus, type 2    HLD (hyperlipidemia)    HTN (hypertension), benign    Hypothyroidism    Congestive heart failure (CHF)    Diabetes    Hypothyroidism    History of total abdominal hysterectomy and bilateral salpingo-oophorectomy    History of hysterectomy    History of cataract extraction    History of thyroidectomy, total          REVIEW OF SYSTEMS:  CONSTITUTIONAL: No fever, weight loss, or fatigue  EYES: No eye pain, visual disturbances, or discharge  ENMT:  No difficulty hearing, tinnitus, vertigo; No sinus or throat pain  NECK: No pain or stiffness  BREASTS: No pain, masses, or nipple discharge  RESPIRATORY: No cough, wheezing, chills or hemoptysis; No shortness of breath  CARDIOVASCULAR: No chest pain, palpitations, dizziness, or leg swelling  GASTROINTESTINAL: As above   GENITOURINARY: No dysuria, frequency, hematuria, or incontinence  NEUROLOGICAL: No headaches, memory loss, loss of strength, numbness, or tremors  SKIN: No itching, burning, rashes, or lesions   LYMPH NODES: No enlarged glands  ENDOCRINE: No heat or cold intolerance; No hair loss  MUSCULOSKELETAL: No joint pain or swelling; No muscle, back, or extremity pain  PSYCHIATRIC: No depression, anxiety, mood swings, or difficulty sleeping  HEME/LYMPH: No easy bruising, or bleeding gums  ALLERGY AND IMMUNOLOGIC: No hives or eczema    Vital Signs Last 24 Hrs  T(C): 37.2 (16 Feb 2022 20:11), Max: 37.2 (16 Feb 2022 20:11)  T(F): 98.9 (16 Feb 2022 20:11), Max: 98.9 (16 Feb 2022 20:11)  HR: 108 (16 Feb 2022 20:11) (74 - 108)  BP: 99/68 (16 Feb 2022 20:11) (99/68 - 128/93)  BP(mean): --  RR: 20 (16 Feb 2022 20:11) (20 - 22)  SpO2: 92% (16 Feb 2022 20:11) (88% - 100%)    PHYSICAL EXAM:  GENERAL: NAD, well-groomed, well-developed  HEAD:  Atraumatic, Normocephalic  EYES: EOMI, PERRLA, conjunctiva and sclera clear  NECK: Supple, No JVD, Normal thyroid  NERVOUS SYSTEM: Confused, easy to arouse  CHEST/LUNG: Clear to percussion bilaterally; No rales, rhonchi, wheezing, or rubs  HEART: Regular rate and rhythm; No murmurs, rubs, or gallops  ABDOMEN: Soft, Nontender, Nondistended; Bowel sounds present  EXTREMITIES:  2+ Peripheral Pulses, No clubbing, cyanosis, or edema  LYMPH: No lymphadenopathy noted  SKIN: No rashes or lesions    LAB  02-13 @ 22:03  amylase --   lipase 186                           14.0   7.55  )-----------( 254      ( 15 Feb 2022 08:25 )             45.8       CBC:  02-15 @ 08:25  WBC 7.55   Hgb 14.0   Hct 45.8   Plts 254  MCV 96.6  02-13 @ 21:26  WBC 4.62   Hgb 14.3   Hct 46.3   Plts 266  MCV 94.7      Chemistry:  02-16 @ 12:22  Na+ 134  K+ 6.1  Cl- 103  CO2 22  BUN 89  Cr 4.11     02-15 @ 12:05  Na+ 135  K+ 5.8  Cl- 103  CO2 23  BUN 81  Cr 3.63     02-13 @ 22:03  Na+ 135  K+ 5.0  Cl- 102  CO2 25  BUN 71  Cr 3.02         Glucose, Serum: 130 mg/dL (02-16 @ 12:22)  Glucose, Serum: 69 mg/dL (02-15 @ 12:05)  Glucose, Serum: 121 mg/dL (02-13 @ 22:03)      16 Feb 2022 12:22    134    |  103    |  89     ----------------------------<  130    6.1     |  22     |  4.11   15 Feb 2022 12:05    135    |  103    |  81     ----------------------------<  69     5.8     |  23     |  3.63   13 Feb 2022 22:03    135    |  102    |  71     ----------------------------<  121    5.0     |  25     |  3.02     Ca    8.8        16 Feb 2022 12:22  Ca    9.5        15 Feb 2022 12:05  Ca    9.2        13 Feb 2022 22:03  Phos  6.5       16 Feb 2022 12:22  Phos  6.6       15 Feb 2022 12:05  Mg     2.6       16 Feb 2022 12:22  Mg     2.5       15 Feb 2022 12:05  Mg     3.0       13 Feb 2022 22:03    TPro  7.1    /  Alb  2.7    /  TBili  1.3    /  DBili  x      /  AST  40     /  ALT  23     /  AlkPhos  153    15 Feb 2022 12:05  TPro  7.3    /  Alb  2.7    /  TBili  0.9    /  DBili  x      /  AST  34     /  ALT  20     /  AlkPhos  166    13 Feb 2022 22:03      PT/INR - ( 15 Feb 2022 12:05 )   PT: 15.0 sec;   INR: 1.31 ratio                 CAPILLARY BLOOD GLUCOSE      POCT Blood Glucose.: 101 mg/dL (16 Feb 2022 16:59)  POCT Blood Glucose.: 133 mg/dL (16 Feb 2022 11:13)  POCT Blood Glucose.: 117 mg/dL (16 Feb 2022 08:20)  POCT Blood Glucose.: 95 mg/dL (16 Feb 2022 00:01)          RADIOLOGY & ADDITIONAL TESTS:    Imaging Personally Reviewed:  [ ] YES  [ ] NO    Consultant(s) Notes Reviewed:  [ ] YES  [ ] NO    Care Discussed with Consultants/Other Providers [ ] YES  [ ] NO

## 2022-02-16 NOTE — CHART NOTE - NSCHARTNOTESELECT_GEN_ALL_CORE
Event Note
Event Note
IV insertion/Event Note
Event Note
Indwelling Catheter/Event Note
Palliative Care NP

## 2022-02-16 NOTE — PROGRESS NOTE ADULT - SUBJECTIVE AND OBJECTIVE BOX
Patient is a 89y old  Female who presents with a chief complaint of dyspnea (15 Feb 2022 18:57)      INTERVAL HPI/OVERNIGHT EVENTS: Pt desaturated this morning, is on nonrebreather now, very weak     MEDICATIONS  (STANDING):  atorvastatin 10 milliGRAM(s) Oral at bedtime  dextrose 40% Gel 15 Gram(s) Oral once  dextrose 5%. 1000 milliLiter(s) (50 mL/Hr) IV Continuous <Continuous>  dextrose 5%. 1000 milliLiter(s) (100 mL/Hr) IV Continuous <Continuous>  dextrose 5%. 1000 milliLiter(s) (50 mL/Hr) IV Continuous <Continuous>  dextrose 50% Injectable 25 Gram(s) IV Push once  dextrose 50% Injectable 25 Gram(s) IV Push once  dextrose 50% Injectable 12.5 Gram(s) IV Push once  furosemide   Injectable 40 milliGRAM(s) IV Push two times a day  glucagon  Injectable 1 milliGRAM(s) IntraMuscular once  influenza  Vaccine (HIGH DOSE) 0.7 milliLiter(s) IntraMuscular once  insulin lispro (ADMELOG) corrective regimen sliding scale   SubCutaneous three times a day before meals  insulin lispro (ADMELOG) corrective regimen sliding scale   SubCutaneous three times a day before meals  levothyroxine 50 MICROGram(s) Oral daily  metoprolol tartrate 25 milliGRAM(s) Oral two times a day    MEDICATIONS  (PRN):      Allergies    No Known Allergies    Intolerances        REVIEW OF SYSTEMS:  unable to obtain due to mentation     ICU Vital Signs Last 24 Hrs  T(C): 35.7 (2022 09:12), Max: 36.7 (2022 04:02)  T(F): 96.3 (2022 09:12), Max: 98 (2022 04:02)  HR: 74 (2022 09:12) (74 - 105)  BP: 108/63 (2022 09:12) (90/61 - 116/80)  BP(mean): --  ABP: --  ABP(mean): --  RR: 20 (2022 09:12) (17 - 22)  SpO2: 100% (2022 09:12) (88% - 100%)    PHYSICAL EXAM:  GENERAL: NAD, ill looking  HEAD:  Atraumatic, Normocephalic  EYES: EOMI, PERRLA, conjunctiva and sclera clear  ENMT: No tonsillar erythema, exudates, or enlargement; Moist mucous membranes, Good dentition, No lesions  NECK: Supple, No JVD,  NERVOUS SYSTEM:  Alert & Oriented X0  CHEST/LUNG: Clear to percussion bilaterally; No rales, rhonchi, wheezing, or rubs  HEART: Regular rate and rhythm; No murmurs, rubs, or gallops  ABDOMEN: moderately distended  Bowel sounds present  EXTREMITIES:  2+ Peripheral Pulses, No clubbing, cyanosis, +2 edema of upper ext/lower ext  LYMPH: No lymphadenopathy noted  SKIN: No rashes or lesions    LABS:                                    14.0   7.55  )-----------( 254      ( 15 Feb 2022 08:25 )             45.8     02-15    135  |  103  |  81<H>  ----------------------------<  69<L>  5.8<H>   |  23  |  3.63<H>    Ca    9.5      15 Feb 2022 12:05  Phos  6.6     -15  Mg     2.5     02-15    TPro  7.1  /  Alb  2.7<L>  /  TBili  1.3<H>  /  DBili  x   /  AST  40<H>  /  ALT  23  /  AlkPhos  153<H>  02-15    PT/INR - ( 15 Feb 2022 12:05 )   PT: 15.0 sec;   INR: 1.31 ratio           Urinalysis Basic - ( 2022 10:39 )    Color: Yellow / Appearance: Clear / S.020 / pH: x  Gluc: x / Ketone: Trace  / Bili: Small / Urobili: 1 mg/dL   Blood: x / Protein: 100 mg/dL / Nitrite: Negative   Leuk Esterase: Small / RBC: 11-25 /HPF / WBC 11-25   Sq Epi: x / Non Sq Epi: Moderate / Bacteria: Moderate

## 2022-02-16 NOTE — PROGRESS NOTE ADULT - ASSESSMENT
HPI:  Patient is a 89F with a PMH of CHF (EF reportedly 30% last year), HTN, HLD, hypothyroidism who presents to the ED for dyspnea.    Patient currently AAOx2, unable to provide history.  Per ED staff who spoke to family members, patient has had increased dyspnea and lower extremity swelling.  No other complaints.  SpO2 currently 100% on 4L via NC.  Hypothermic in ED - 95.3, rectal.  Labs show elevated creatinine and elevated troponins.  Will admit to tele. (14 Feb 2022 00:39)  ------ As Above ------- History obtained from chart, MD and daughter  Patient is a poor historian ( Not communicating ) Consult called for new onset ascites by ultrasound.   Patient has no history of ascites, liver disease, hepatitis, ETOH abuse, new medications or family history liver disease.   Recent history of nausea, vague abdominal pain, bleeding hemorrhoids and urination --> BM  Last colonoscopy was ~ 9 years ago : Polyps / Diverticulum    A) Ascites - New onset - Probably from long standing congestive liver --> cirrhosis VS acute congestive liver, renal disease, malignancy etc. 1) Diagnostic ( R/O SBP- Patient with abdominal pain - cell count,  cytology, and albumin gradient ) VS Therapeutic ( abdominal pain ) 2) Liver blood tests 3) Agree with diuretics 4) No liver biopsy at the present time   B) Colon cancer screening - history of polyps - Supportive care for now    [id number]  ---------   A) Ascites - s/p 3 liters removed during paracentesis - No fluid was sent to lab. Discussed with IR. There is not enough fluid to even to do a diagnostic tap. Supportive care for now ( see previous notes. Diuretics as per cardiology / nephrology )   B) Colon cancer screening - history of polyps - Supportive care for now

## 2022-02-16 NOTE — PROGRESS NOTE ADULT - ASSESSMENT
Acute on chronic systolic congestive heart failure.    Pulm edema on CXR, bilateral leg swelling  Large volume ascites with bilateral pleural effusions  Troponin elevated  HTN  Failue to Thrive  s/p paracenthesis yesterday 3L   Continue lasix,   Smith   Metoprolol on hold due to hypotension now  Synthroid, Lipitor- discontinued to due pt having problems swallowing  Failure to Thrive- pt not eating, on D5 very low dose        Daughter agreed to DNR/DNI yesterady  Will speak about Hospice-pt with very poor prognosis.  Acute on chronic systolic congestive heart failure.    Pulm edema on CXR, bilateral leg swelling  Large volume ascites with bilateral pleural effusions  Troponin elevated  HTN  Failue to Thrive  s/p paracenthesis yesterday 3L   Continue lasix,   Smith   Metoprolol on hold due to hypotension now  Synthroid, Lipitor- discontinued to due pt having problems swallowing  Failure to Thrive- pt not eating, on D5 very low dose  As per GI- Ascites - New onset - Probably from long standing congestive liver --> cirrhosis VS acute congestive liver, renal disease, malignancy ,No liver biopsy at the present time   Colon cancer screening - history of polyps - Supportive care for now          Daughter agreed to DNR/DNI yesterady  Will speak about Hospice-pt with very poor prognosis.

## 2022-02-16 NOTE — PROGRESS NOTE ADULT - ASSESSMENT
89 F with CHF admitted with acute on chronic systolic heart failure exacerbation   she has pulmonary edema and effusions on CXR (I personally reviewed)   Her BNP is elevated   echo reviewed: moderate AS and global systolic dysfunction,  low EF  she has large volume ascites which may be transudative and would go along with heart failure or cirrhosis   paracentesis today->please continue albumin supplementation   would hold off antibiotics   can send procalcitonin  management of CHF and diuretics per medicine and cardiology   ongoing discussions with family regarding GOC     2/16: spoke with pt's daughters,       D/W Dr. Mcclellan and Dr. Keya Vicente, DO  Infectious Disease Attending  Reachable via Microsoft Teams or ID office: [phone]  After 5pm/weekends please call [phone] for all inquiries and new consults          89 F with CHF admitted with acute on chronic systolic heart failure exacerbation   she has pulmonary edema and effusions on CXR (I personally reviewed)   Her BNP is elevated   echo reviewed: moderate AS and global systolic dysfunction,  low EF  she has large volume ascites which may be transudative and would go along with heart failure or cirrhosis   paracentesis today->please continue albumin supplementation   would hold off antibiotics   can send procalcitonin  management of CHF and diuretics per medicine and cardiology   ongoing discussions with family regarding GOC     2/16: spoke with pt's daughters, would like to have a conversation about hospice, Dr. Forman notified. The pt is afebrile, on NRB, no new lab work, s/p paracentesis yesterday and 3000 ml removed, BCs with no growth, no concern for any uncontrolled infection at this time, no role for abx.       Suggestions  - continue to monitor off abx  - trend pt's temperatures and WBC  - follow culture data  - palliative care follow up appreciated     Discussed with Dr. Forman  Discussed with Dr. Vicente

## 2022-02-17 PROBLEM — E11.9 TYPE 2 DIABETES MELLITUS WITHOUT COMPLICATIONS: Chronic | Status: ACTIVE | Noted: 2022-01-01

## 2022-02-17 PROBLEM — E03.9 HYPOTHYROIDISM, UNSPECIFIED: Chronic | Status: ACTIVE | Noted: 2022-01-01

## 2022-02-17 NOTE — HOSPICE CARE NOTE - HOSPICE APPROVAL ADDITIONAL DETAILS
Patient approved for inpatient at [facility] when oxygen weaned to 10L or below.

## 2022-02-17 NOTE — DISCHARGE NOTE PROVIDER - NSDCCPCAREPLAN_GEN_ALL_CORE_FT
PRINCIPAL DISCHARGE DIAGNOSIS  Diagnosis: CHF exacerbation  Assessment and Plan of Treatment:       SECONDARY DISCHARGE DIAGNOSES  Diagnosis: Elevated troponin I level  Assessment and Plan of Treatment:     Diagnosis: MARINA (acute kidney injury)  Assessment and Plan of Treatment:     Diagnosis: Elevated d-dimer  Assessment and Plan of Treatment:

## 2022-02-17 NOTE — PROGRESS NOTE ADULT - PROBLEM SELECTOR PLAN 2
US of the abdomen: Nodular contour of the liver suggesting cirrhosis  s/p paracentesis yesterday 3000mL drained   abdomen still appears firm and upper extremity swelling
US of the abdomen: Nodular contour of the liver suggesting cirrhosis  s/p paracentesis 3000mL drained   abdomen still appears firm and upper extremity swelling

## 2022-02-17 NOTE — PROGRESS NOTE ADULT - ASSESSMENT
Acute on chronic systolic congestive heart failure.    Pulm edema on CXR, bilateral leg swelling  Large volume ascites with bilateral pleural effusions  Troponin elevated  HTN  Failue to Thrive    Very poor prognosis   Pt accepted under [facility]osp[facility]  Supportive care only   s/p paracentesis  Continue lasix, morphine prn

## 2022-02-17 NOTE — H&P ADULT - NSHPREVIEWOFSYSTEMS_GEN_ALL_CORE
REVIEW OF SYSTEMS:    CONSTITUTIONAL:  has weakness, No fevers or chills  EYES/ENT: No visual changes;  No vertigo or throat pain   NECK: No pain or stiffness  RESPIRATORY: No cough, wheezing, hemoptysis; No shortness of breath  CARDIOVASCULAR: No chest pain or palpitations [ ] CHF [ ] MI [ ] CABG [ ]  GASTROINTESTINAL: No abdominal or epigastric pain. No nausea, vomiting, or hematemesis; No diarrhea or constipation. No melena or hematochezia. [ ]abdominal surgery [ ] prostrate problems   GENITOURINARY: No dysuria, frequency or hematuria   NEUROLOGICAL: No numbness or weakness. No hx of seizures  SKIN: No itching, burning, rashes, or lesions   All other review of systems is negative unless indicated above.

## 2022-02-17 NOTE — PROGRESS NOTE ADULT - PROBLEM SELECTOR PLAN 3
BUN/creatinine continues to trend upward  lasix 40mg IVP 2 times per day  hyperkalemic yesterdays labs  US abdomen suggestive of cirrhosis may be component of hepatorenal?  urine dark color and poor output
BUN/creatinine continues to trend upward  lasix 40mg IVP 2 times per day  hyperkalemic on todays labs  US abdomen suggestive of cirrhosis may be component of hepatorenal?  urine dark color and poor output

## 2022-02-17 NOTE — PROGRESS NOTE ADULT - TIME BILLING
GI
GI
Evaluation of patient, review of medical records and collaboration with medical team and family.
evaluation of patient, review of records and collaboration with medical team and patient's family

## 2022-02-17 NOTE — PROGRESS NOTE ADULT - PROBLEM SELECTOR PLAN 1
on NRB  pleural effusions seen on US  shallow breathing-appears to be approaching the end of her life  can consider low dose opioids for dyspnea relief
being transitioned to NC (green tubing) at 10L  pleural effusions seen on US  shallow breathing-appears to be approaching the end of her life  Dilaudid ATC and PRN for dyspnea/severe pain

## 2022-02-17 NOTE — PROGRESS NOTE ADULT - PROBLEM SELECTOR PLAN 7
Patient's daughter signed DNR/I on MOLST in chart.  Had conversation with daughter, Jaz and her cousin in person yesterday and they requested hospice eval.  Jaz was asking about taking patient home on hospice with [facility] liaison.  Called Jaz today and discussed possible transfer to [facility] for inpatient hospice care.  She was hoping patient would be able to return home, but discussed that she may not be able to be care for at home and might benefit from placement at [facility] for inpatient hospice.  She was agreeable to that plan and discussed with hospice liaison who will speak further with patient's daughter.       Discussed with Dr. Mcclellan

## 2022-02-17 NOTE — PROGRESS NOTE ADULT - SUBJECTIVE AND OBJECTIVE BOX
Patient is a 89y old  Female who presents with a chief complaint of dyspnea (17 Feb 2022 12:33)      HPI:  Patient is a 89F with a PMH of CHF (EF reportedly 30% last year), HTN, HLD, hypothyroidism who presents to the ED for dyspnea.    Patient currently AAOx2, unable to provide history.  Per ED staff who spoke to family members, patient has had increased dyspnea and lower extremity swelling.  No other complaints.  SpO2 currently 100% on 4L via NC.  Hypothermic in ED - 95.3, rectal.  Labs show elevated creatinine and elevated troponins.  Will admit to tele. (14 Feb 2022 00:39)      INTERVAL HPI/OVERNIGHT EVENTS:  MS unchanged. SOB (+) Patient not communicating. No abdominal pain, N/V    MEDICATIONS  (STANDING):  dextrose 40% Gel 15 Gram(s) Oral once  dextrose 5%. 1000 milliLiter(s) (100 mL/Hr) IV Continuous <Continuous>  dextrose 5%. 1000 milliLiter(s) (50 mL/Hr) IV Continuous <Continuous>  dextrose 50% Injectable 25 Gram(s) IV Push once  dextrose 50% Injectable 25 Gram(s) IV Push once  dextrose 50% Injectable 12.5 Gram(s) IV Push once  furosemide   Injectable 40 milliGRAM(s) IV Push two times a day  glucagon  Injectable 1 milliGRAM(s) IntraMuscular once  HYDROmorphone  Injectable 0.5 milliGRAM(s) IV Push every 6 hours  influenza  Vaccine (HIGH DOSE) 0.7 milliLiter(s) IntraMuscular once  insulin lispro (ADMELOG) corrective regimen sliding scale   SubCutaneous three times a day before meals  insulin lispro (ADMELOG) corrective regimen sliding scale   SubCutaneous three times a day before meals    MEDICATIONS  (PRN):  HYDROmorphone  Injectable 1 milliGRAM(s) IV Push every 1 hour PRN dyspnea or severe pain      FAMILY HISTORY:  FH: HTN (hypertension)        Allergies    No Known Allergies    Intolerances        PMH/PSH:  CAD (coronary artery disease)    Diabetes mellitus, type 2    HLD (hyperlipidemia)    HTN (hypertension), benign    Hypothyroidism    Congestive heart failure (CHF)    Diabetes    Hypothyroidism    History of total abdominal hysterectomy and bilateral salpingo-oophorectomy    History of hysterectomy    History of cataract extraction    History of thyroidectomy, total          REVIEW OF SYSTEMS: Uncommunicative   CONSTITUTIONAL: No fever, weight loss  EYES: No eye pain, visual disturbances, or discharge  ENMT:  No difficulty hearing, tinnitus, vertigo; No sinus or throat pain  NECK: No pain or stiffness  BREASTS: No pain, masses, or nipple discharge  RESPIRATORY: No cough, wheezing, chills or hemoptysis; No shortness of breath  CARDIOVASCULAR: No chest pain, palpitations, dizziness, or leg swelling  GASTROINTESTINAL: See above   GENITOURINARY: No dysuria, frequency, hematuria, or incontinence  NEUROLOGICAL: No headaches, numbness, or tremors  SKIN: No itching, burning, rashes, or lesions   LYMPH NODES: No enlarged glands  ENDOCRINE: No heat or cold intolerance; No hair loss  MUSCULOSKELETAL: No joint pain or swelling; No muscle, back, or extremity pain  PSYCHIATRIC: No depression, anxiety, mood swings, or difficulty sleeping  HEME/LYMPH: No easy bruising, or bleeding gums  ALLERGY AND IMMUNOLOGIC: No hives or eczema    Vital Signs Last 24 Hrs  T(C): 36.7 (17 Feb 2022 11:22), Max: 37.2 (16 Feb 2022 20:11)  T(F): 98 (17 Feb 2022 11:22), Max: 98.9 (16 Feb 2022 20:11)  HR: 92 (17 Feb 2022 11:22) (92 - 119)  BP: 115/84 (17 Feb 2022 11:22) (93/63 - 128/93)  BP(mean): --  RR: 19 (17 Feb 2022 11:22) (19 - 20)  SpO2: 94% (17 Feb 2022 11:22) (86% - 100%)    PHYSICAL EXAM:  GENERAL: NAD, well-groomed, well-developed  HEAD:  Atraumatic, Normocephalic  EYES: EOMI, PERRLA, conjunctiva and sclera clear  NECK: Supple, No JVD, Normal thyroid  NERVOUS SYSTEM:  Uncommunicative   CHEST/LUNG: Clear to percussion bilaterally; No rales, rhonchi, wheezing, or rubs  HEART: Regular rate and rhythm; No murmurs, rubs, or gallops  ABDOMEN: Soft, Nontender, Nondistended; Bowel sounds present  EXTREMITIES:  2+ Peripheral Pulses, No clubbing, cyanosis, or edema  LYMPH: No lymphadenopathy noted  SKIN: No rashes or lesions    LAB  02-13 @ 22:03  amylase --   lipase 186         CBC:  02-15 @ 08:25  WBC 7.55   Hgb 14.0   Hct 45.8   Plts 254  MCV 96.6  02-13 @ 21:26  WBC 4.62   Hgb 14.3   Hct 46.3   Plts 266  MCV 94.7      Chemistry:  02-16 @ 12:22  Na+ 134  K+ 6.1  Cl- 103  CO2 22  BUN 89  Cr 4.11     02-15 @ 12:05  Na+ 135  K+ 5.8  Cl- 103  CO2 23  BUN 81  Cr 3.63     02-13 @ 22:03  Na+ 135  K+ 5.0  Cl- 102  CO2 25  BUN 71  Cr 3.02         Glucose, Serum: 130 mg/dL (02-16 @ 12:22)  Glucose, Serum: 69 mg/dL (02-15 @ 12:05)  Glucose, Serum: 121 mg/dL (02-13 @ 22:03)      16 Feb 2022 12:22    134    |  103    |  89     ----------------------------<  130    6.1     |  22     |  4.11   15 Feb 2022 12:05    135    |  103    |  81     ----------------------------<  69     5.8     |  23     |  3.63   13 Feb 2022 22:03    135    |  102    |  71     ----------------------------<  121    5.0     |  25     |  3.02     Ca    8.8        16 Feb 2022 12:22  Ca    9.5        15 Feb 2022 12:05  Ca    9.2        13 Feb 2022 22:03  Phos  6.5       16 Feb 2022 12:22  Phos  6.6       15 Feb 2022 12:05  Mg     2.6       16 Feb 2022 12:22  Mg     2.5       15 Feb 2022 12:05  Mg     3.0       13 Feb 2022 22:03    TPro  7.1    /  Alb  2.7    /  TBili  1.3    /  DBili  x      /  AST  40     /  ALT  23     /  AlkPhos  153    15 Feb 2022 12:05  TPro  7.3    /  Alb  2.7    /  TBili  0.9    /  DBili  x      /  AST  34     /  ALT  20     /  AlkPhos  166    13 Feb 2022 22:03              CAPILLARY BLOOD GLUCOSE      POCT Blood Glucose.: 101 mg/dL (17 Feb 2022 11:16)  POCT Blood Glucose.: 93 mg/dL (17 Feb 2022 05:53)  POCT Blood Glucose.: 103 mg/dL (16 Feb 2022 23:42)  POCT Blood Glucose.: 101 mg/dL (16 Feb 2022 16:59)          RADIOLOGY & ADDITIONAL TESTS:    Imaging Personally Reviewed:  [ ] YES  [ ] NO    Consultant(s) Notes Reviewed:  [ ] YES  [ ] NO    Care Discussed with Consultants/Other Providers [ ] YES  [ ] NO

## 2022-02-17 NOTE — DISCHARGE NOTE PROVIDER - HOSPITAL COURSE
89 y/o F w/ PMHX of DM, Hypothyroidism, A.Fib, CHF p/w SOB, and diffuse pitting edema extending from lower extremity to upper abdomen.  Patient hospital course complicated by a.fib w/ rapid ventricular rate and periods of hypotension.  Patient additionally refusing to eat.  Discussion was made w/ palliative team and family, DNR/DNI molst form signed and patient was accepted to Hospice.    Acute CHF/ Anasarca/ End stage cardiac disease  - patient accepted to inpatient hospice  - c/w 10L oxygen via NC  - Dilaudid ATC and PRN

## 2022-02-17 NOTE — PROGRESS NOTE ADULT - PROBLEM SELECTOR PLAN 4
patient lethargic, opens eyes spontaneously to voice at times  not taking po  multifactorial but appears to be doing poorly may be hypoactive delirium
patient lethargic, opens eyes spontaneously to voice at times  not taking po  multifactorial but appears to be doing poorly may be hypoactive delirium

## 2022-02-17 NOTE — PROGRESS NOTE ADULT - PROBLEM SELECTOR PLAN 6
TSH elevated   was on levothyroxine 50mcg daily-now discontinued
TSH elevated   was on levothyroxine 50mcg daily-now discontinued

## 2022-02-17 NOTE — PROGRESS NOTE ADULT - PROVIDER SPECIALTY LIST ADULT
Cardiology
Hospitalist
Cardiology
Gastroenterology
Gastroenterology
Hospitalist
Nephrology
Hospitalist
Infectious Disease
Palliative Care
Palliative Care

## 2022-02-17 NOTE — PROGRESS NOTE ADULT - SUBJECTIVE AND OBJECTIVE BOX
Patient is a 89y old  Female who presents with a chief complaint of dyspnea (15 Feb 2022 18:57)      INTERVAL HPI/OVERNIGHT EVENTS: Pt sating well on NC-10L now, vitals stable,     MEDICATIONS  (STANDING):  dextrose 40% Gel 15 Gram(s) Oral once  dextrose 5%. 1000 milliLiter(s) (100 mL/Hr) IV Continuous <Continuous>  dextrose 5%. 1000 milliLiter(s) (50 mL/Hr) IV Continuous <Continuous>  dextrose 50% Injectable 25 Gram(s) IV Push once  dextrose 50% Injectable 25 Gram(s) IV Push once  dextrose 50% Injectable 12.5 Gram(s) IV Push once  furosemide   Injectable 40 milliGRAM(s) IV Push two times a day  glucagon  Injectable 1 milliGRAM(s) IntraMuscular once  HYDROmorphone  Injectable 0.5 milliGRAM(s) IV Push every 6 hours  influenza  Vaccine (HIGH DOSE) 0.7 milliLiter(s) IntraMuscular once  insulin lispro (ADMELOG) corrective regimen sliding scale   SubCutaneous three times a day before meals  insulin lispro (ADMELOG) corrective regimen sliding scale   SubCutaneous three times a day before meals    MEDICATIONS  (PRN):  HYDROmorphone  Injectable 1 milliGRAM(s) IV Push every 1 hour PRN dyspnea or severe pain      Allergies    No Known Allergies    Intolerances        REVIEW OF SYSTEMS:  unable to obtain due to mentation     ICU Vital Signs Last 24 Hrs  T(C): 36.7 (17 Feb 2022 11:22), Max: 37.2 (16 Feb 2022 20:11)  T(F): 98 (17 Feb 2022 11:22), Max: 98.9 (16 Feb 2022 20:11)  HR: 92 (17 Feb 2022 11:22) (92 - 119)  BP: 115/84 (17 Feb 2022 11:22) (93/63 - 128/93)  BP(mean): --  ABP: --  ABP(mean): --  RR: 19 (17 Feb 2022 11:22) (19 - 20)  SpO2: 94% (17 Feb 2022 11:22) (86% - 100%)      PHYSICAL EXAM:  GENERAL: NAD, ill looking  HEAD:  Atraumatic, Normocephalic  EYES: EOMI, PERRLA, conjunctiva and sclera clear  ENMT: No tonsillar erythema, exudates, or enlargement; Moist mucous membranes, Good dentition, No lesions  NECK: Supple, No JVD,  NERVOUS SYSTEM:  Alert & Oriented X0  CHEST/LUNG: Clear to percussion bilaterally; No rales, rhonchi, wheezing, or rubs  HEART: Regular rate and rhythm; No murmurs, rubs, or gallops  ABDOMEN: moderately distended  Bowel sounds present  EXTREMITIES:  2+ Peripheral Pulses, No clubbing, cyanosis, +2 edema of upper ext/lower ext  : butler filled with orange tinge urine  LYMPH: No lymphadenopathy noted  SKIN: No rashes or lesions    LABS:                                     02-16    134<L>  |  103  |  89<H>  ----------------------------<  130<H>  6.1<H>   |  22  |  4.11<H>    Ca    8.8      16 Feb 2022 12:22  Phos  6.5     02-16  Mg     2.6     02-16

## 2022-02-17 NOTE — H&P ADULT - ASSESSMENT
chf   ascites- large volume - had paracentesis  on 2-15- 3000ml of transudate removed.  ko  Pleural effusions  hx of hypothyroidism  severe trisuspid regurgitation, Mod AS,  Mild PUl HTN- LVEF 30%   Failure to thrive.    Not a candidate for b blockers or  ACT as per cardiology     Assessment- terminally ill patient  for comfort care. DNR/DNI   oxygen supplementation ,  requiring BIPAP at times.

## 2022-02-17 NOTE — H&P ADULT - NSICDXPASTMEDICALHX_GEN_ALL_CORE_FT
PAST MEDICAL HISTORY:  Ascites     CAD (coronary artery disease)     Congestive heart failure (CHF) lvef 30%    Diabetes     Diabetes mellitus, type 2     HLD (hyperlipidemia)     HTN (hypertension), benign     Hypothyroidism     Hypothyroidism

## 2022-02-17 NOTE — HOSPICE CARE NOTE - CONVESATION DETAILS
Patient approved for inpatient hospice at [facility] by , pending weaning to 10L oxygen or less, as [facility] max oxygen delivery is 10L.  Called to patient's daughter to discuss.  Reviewed inpatient hospice at [facility].  Daughter stating multiple times she would rather have patient home.  Discussed home hospice services and plan of care several times.  Daughter with repeated questions regarding patient eating, drinking and taking medications.  Reviewed End of Life care, natural progression and comfort medications several times.  Daughter may not be accepting of patient's current status.  Collaborated with Palliative NP Laureen Bahena, she stated she would speak with daughter.  Daughter called to [facility] again, stated she was getting the house ready for mother to come home.  Made her aware that the MD feels patient may not be stable for dc and encouraged her to call hospital, to speak with MD or SW regarding patient status and possibility of discharge home.    Consents emailed to daughter.  Will follow for hospice admission.

## 2022-02-17 NOTE — H&P ADULT - REASON FOR ADMISSION
failure to thrive,  diastolic and systolic chf, ,   Large volume ascites,   ko,  comfort care, DNR/DNI

## 2022-02-17 NOTE — H&P ADULT - NSHPPHYSICALEXAM_GEN_ALL_CORE
General:   looking tired,  weak,   requiring Oxygen supplantation, lethargic  skin : Normal  Head. Dwight. No lacerations  Neck: Supple, NO JVD  Cardiac: S1 S2, No M/R/G  Pulmonary: CTAP, Breathing unlabored, No Rhonchi/Rales/Wheezing  Abdomen: Soft, Non -tender, +BS x 4 quads  Neuro: A/o x 3, No focal deficits. Normal Motor and sensory exam  Vascular: Normal distal pulses.  Extremities: . No rashes. No edema  Decubiti ; None

## 2022-02-17 NOTE — H&P ADULT - NSICDXPASTSURGICALHX_GEN_ALL_CORE_FT
PAST SURGICAL HISTORY:  History of cataract extraction     History of hysterectomy     History of thyroidectomy, total

## 2022-02-17 NOTE — PROGRESS NOTE ADULT - ASSESSMENT
HPI:  Patient is a 89F with a PMH of CHF (EF reportedly 30% last year), HTN, HLD, hypothyroidism who presents to the ED for dyspnea.    Patient currently AAOx2, unable to provide history.  Per ED staff who spoke to family members, patient has had increased dyspnea and lower extremity swelling.  No other complaints.  SpO2 currently 100% on 4L via NC.  Hypothermic in ED - 95.3, rectal.  Labs show elevated creatinine and elevated troponins.  Will admit to tele. (14 Feb 2022 00:39)  ------ As Above ------- History obtained from chart, MD and daughter  Patient is a poor historian ( Not communicating ) Consult called for new onset ascites by ultrasound.   Patient has no history of ascites, liver disease, hepatitis, ETOH abuse, new medications or family history liver disease.   Recent history of nausea, vague abdominal pain, bleeding hemorrhoids and urination --> BM  Last colonoscopy was ~ 9 years ago : Polyps / Diverticulum    A) Ascites - New onset - Probably from long standing congestive liver --> cirrhosis VS acute congestive liver, renal disease, malignancy etc. Elevated LFTs ( see below ) 1) Diagnostic ( R/O SBP- Patient with abdominal pain , cytology, and albumin gradient ) 2) Liver blood tests 3) Agree with diuretics 4) No liver biopsy at the present time   B) Colon cancer screening - history of polyps - Supportive care for now  C) Elevated LFTs - 0.9/34/20/166 --> 1.3/40/23/153   ===== Patient to be admitted to Hospice , Supportive care only ( may need paracentesis PRN for comfort care in the future ). Will sign off once patient is on Hospice.

## 2022-02-17 NOTE — H&P ADULT - HISTORY OF PRESENT ILLNESS
89 yo F with sob, LE edema, retaining fluid.  Pt. was told to come to ER by visiting nurse today.  Pt. has a history of CHF, but it's never been this bad, insidious onset.  No other specific inciting event.  Daughter at bedside corroborates history.   	ROS: negative for fever, cough, headache, abd pain, nausea, vomiting, diarrhea, rash, paresthesia, and weakness--all other systems reviewed are negative.   PMH: hypothyroid, HTN, CHF with 30% EF last year, DM, HLD; Meds: levothyroxine, furosemide, metoprolol, amlodipine, atorvastatin; SH: Denies smoking/drinking/drug use   patient was admitted to medical service on the 12th  and was evaluated by cardiology and Gi and under went  peritoneocentesis( Paracentesis) with removal of 3000 ml of fluid, transudate,     she recieved iv lasix and o2. a decision was made by daughter   to admit to hospice after goals of care discussion with palliative care and DNR/DNI was signed.      patient is  lethargic and not eating or drinking and needs oxygen supplementation is poor.  DIAGNOSTIC TESTING:    [X] Echocardiogram:< from: TTE Echo Complete w/o Contrast w/ Doppler (02.14.22 @ 12:30) >  Left Ventricle: The left ventricular internal cavity size is normal. Left   ventricular wall thickness is normal.  Global LV systolic function was severely decreased. Left ventricular   ejection fraction, by visual estimation, is 30 to 35%. The   interventricular septum is flattened in diastole ('D' shaped left   ventricle), consistent with right ventricular volume overload. The left   ventricular diastolic function couldnot be assessed in this study.      LV Wall Scoring:  The basal and mid inferior wall, basal and mid inferolateral wall, mid  inferoseptal segment, and mid anterolateral segment are akinetic.    Right Ventricle: The right ventricular size is moderately enlarged. RV   wall thickness is mildly increased. RV systolic function is normal.   Findings are consistent with pulmonary hypertension.  Left Atrium: Mildly enlarged left atrium. Atrial septum bowing into left   atrium due to increased RA pressure.  Right Atrium: The right atrial pressure is moderately elevated. Severely   enlarged right atrium.  Pericardium: There is no evidence of pericardial effusion.  Mitral Valve: Structurally normal mitral valve, with normal leaflet   excursion. No evidence of mitral valve regurgitation is seen.  Tricuspid Valve: Structurally normal tricuspid valve, with normal leaflet   excursion. Severe tricuspid regurgitation is visualized. Estimated   pulmonary artery systolic pressure is 42.5 mmHg assuming a right atrial   pressure of 15 mmHg, which is consistent with mild pulmonary hypertension.  Aortic Valve: Sclerotic aortic valve with decreased opening. Peak   transaortic gradient equals 8.4 mmHg, mean transaortic gradient equals   3.2 mmHg, the calculated aortic valve area equals 1.33 cm² by the   continuity equation consistent with moderate aortic stenosis. The peak   aortic velocity was obtained from the apical view. No evidence of aortic   valve regurgitation is seen.  Pulmonic Valve: Structurally normal pulmonic valve, with normal leaflet   excursion. Mild pulmonic valve regurgitation.  Aorta: The aortic root and ascending aorta are structurally normal, with   no evidence of dilitation.  Pulmonary Artery: The main pulmonary artery is normalin size.  Venous: The inferior vena cava was dilated, with respiratory size   variation less than 50%, consistent with elevated right atrial pressure.    Summary:   1. Left ventricular ejection fraction, by visual estimation, is 30 to   35%.   2. Severely decreased global left ventricular systolic function.   3. Severely enlarged right atrium.   4. The left ventricular diastolic function could not be assessed in this   study.   5. There is moderate concentric left ventricular hypertrophy.   6. Mildly increased RV wall thickness.   7. Moderately enlarged right ventricle.   8. Mildly enlarged left atrium.   9. No evidence of mitral valve regurgitation.  10. Severe tricuspid regurgitation.  11. Sclerotic aortic valve with decreased opening.  12. Mild pulmonic valve regurgitation.  13. Estimated pulmonary artery systolic pressure is 42.5 mmHg assuming a   right atrial pressure of 15 mmHg, which is consistent with mild pulmonary   hypertension.  14. Peak transaortic gradient equals 8.4 mmHg,mean transaortic gradient   equals 3.2 mmHg, the calculated aortic valve area equals 1.33 cm² by the   continuity equation consistent with moderate aortic stenosis.  15. Basal and mid inferior wall, basal and mid inferolateral wall, mid   inferoseptal segment, and mid anterolateral segment are abnormal as   described above.

## 2022-02-17 NOTE — PROGRESS NOTE ADULT - SUBJECTIVE AND OBJECTIVE BOX
SUBJECTIVE AND OBJECTIVE: Patient on NRB, lethargic moans to name   INTERVAL HPI/OVERNIGHT EVENTS:    DNR on chart: yes  Allergies    No Known Allergies    Intolerances    MEDICATIONS  (STANDING):  dextrose 40% Gel 15 Gram(s) Oral once  dextrose 5%. 1000 milliLiter(s) (100 mL/Hr) IV Continuous <Continuous>  dextrose 5%. 1000 milliLiter(s) (50 mL/Hr) IV Continuous <Continuous>  dextrose 50% Injectable 25 Gram(s) IV Push once  dextrose 50% Injectable 25 Gram(s) IV Push once  dextrose 50% Injectable 12.5 Gram(s) IV Push once  furosemide   Injectable 40 milliGRAM(s) IV Push two times a day  glucagon  Injectable 1 milliGRAM(s) IntraMuscular once  HYDROmorphone  Injectable 0.5 milliGRAM(s) IV Push every 6 hours  influenza  Vaccine (HIGH DOSE) 0.7 milliLiter(s) IntraMuscular once  insulin lispro (ADMELOG) corrective regimen sliding scale   SubCutaneous three times a day before meals  insulin lispro (ADMELOG) corrective regimen sliding scale   SubCutaneous three times a day before meals    MEDICATIONS  (PRN):  HYDROmorphone  Injectable 1 milliGRAM(s) IV Push every 1 hour PRN dyspnea or severe pain      ITEMS UNCHECKED ARE NOT PRESENT    PRESENT SYMPTOMS: [x ]Unable to obtain due to poor mentation   Source if other than patient:  [ ]Family   [ ]Team     Pain:  [ ]yes [ ]no  QOL impact -   Location -                    Aggravating factors -  Quality -  Radiation -  Timing-  Severity (0-10 scale):  Minimal acceptable level (0-10 scale):     Dyspnea:                           [ ]Mild [ ]Moderate [ ]Severe  Anxiety:                             [ ]Mild [ ]Moderate [ ]Severe  Fatigue:                             [ ]Mild [ ]Moderate [ ]Severe  Nausea:                             [ ]Mild [ ]Moderate [ ]Severe  Loss of appetite:              [ ]Mild [ ]Moderate [ ]Severe  Constipation:                    [ ]Mild [ ]Moderate [ ]Severe    PAIN AD Score:	1  http://geriatrictoolkit.[employer].edu/cog/painad.pdf (Ctrl + left click to view)    Other Symptoms:  [ ]All other review of systems negative     Palliative Performance Status Version 2:         10%      http://npcrc.org/files/news/palliative_performance_scale_ppsv2.pdf  PHYSICAL EXAM:  Vital Signs Last 24 Hrs  T(C): 36.7 (17 Feb 2022 11:22), Max: 37.2 (16 Feb 2022 20:11)  T(F): 98 (17 Feb 2022 11:22), Max: 98.9 (16 Feb 2022 20:11)  HR: 92 (17 Feb 2022 11:22) (92 - 119)  BP: 115/84 (17 Feb 2022 11:22) (93/63 - 128/93)  BP(mean): --  RR: 19 (17 Feb 2022 11:22) (19 - 20)  SpO2: 94% (17 Feb 2022 11:22) (86% - 100%) I&O's Summary    16 Feb 2022 07:01  -  17 Feb 2022 07:00  --------------------------------------------------------  IN: 0 mL / OUT: 0 mL / NET: 0 mL    17 Feb 2022 07:01  -  17 Feb 2022 12:34  --------------------------------------------------------  IN: 0 mL / OUT: 40 mL / NET: -40 mL       GENERAL:  [ ]Alert  [ ]Oriented x   [x ]Lethargic  [ ]Cachexia  [ ]Unarousable  [ ]Verbal  [ x]Non-Verbal  Behavioral:   [ ]Anxiety  [ ]Delirium [ ]Agitation [ ]Other  HEENT:  [ ]Normal   [ x]Dry mouth   [ ]ET Tube/Trach  [ ]Oral lesions  PULMONARY:   [ ]Clear [ ]Tachypnea  [ ]Audible excessive secretions   [ ]Rhonchi        [ ]Right [ ]Left [ ]Bilateral  [ ]Crackles        [ ]Right [ ]Left [ ]Bilateral  [ ]Wheezing     [ ]Right [ ]Left [ ]Bilateral  [ x]Diminished BS [ ] Right [ ]Left [x ]Bilateral  CARDIOVASCULAR:    [x ]Regular [ ]Irregular [ ]Tachy  [ ]Sergio [ ]Murmur [ ]Other  GASTROINTESTINAL:  [ ]Soft  [ ]Distended   [ ]+BS  [ ]Non tender [ ]Tender  [ ]PEG [ ]OGT/ NGT   Last BM:    GENITOURINARY:  [ ]Normal [ ]Incontinent   [ ]Oliguria/Anuria   [x ]Smith  MUSCULOSKELETAL:   [ ]Normal   [ ]Weakness  [x ]Bed/Wheelchair bound [x ]Edema bilateral arms, lower extremities  NEUROLOGIC:   [ ]No focal deficits  [ x] Cognitive impairment  [ ] Dysphagia [ ]Dysarthria [ ] Paresis [ ]Other   SKIN:   [ ]Normal  [ ]Rash   [ ]Pressure ulcer(s) [ ]y [ ]n present on admission  left iliac crest region skin tear     CRITICAL CARE:  [ ]Shock Present  [ ]Septic [ ]Cardiogenic [ ]Neurologic [ ]Hypovolemic  [ ]Vasopressors [ ]Inotropes  [ ]Respiratory failure present [ ]Mechanical Ventilation [ ]Non-invasive ventilatory support [ ]High-Flow  [ ]Acute  [ ]Chronic [ ]Hypoxic  [ ]Hypercarbic [ ]Other  [ ]Other organ failure     LABS:  02-16    134<L>  |  103  |  89<H>  ----------------------------<  130<H>  6.1<H>   |  22  |  4.11<H>    Ca    8.8      16 Feb 2022 12:22  Phos  6.5     02-16  Mg     2.6     02-16    RADIOLOGY & ADDITIONAL STUDIES: none new    Protein Calorie Malnutrition Present: [ ]mild [x ]moderate [ ]severe [ ]underweight [ ]morbid obesity  https://www.andeal.org/vault/2440/web/files/ONC/Table_Clinical%20Characteristics%20to%20Document%20Malnutrition-White%20JV%20et%20al%202012.pdf    Height (cm): 167.6 (02-13-22 @ 20:39)  Weight (kg): 89.9 (02-14-22 @ 04:38)  BMI (kg/m2): 32 (02-14-22 @ 04:38)    [x ]PPSV2 < or = 30%  [ ]significant weight loss [ x]poor nutritional intake [ ]anasarca    [ ]Artificial Nutrition    REFERRALS:   [ ]Chaplaincy  [ ]Hospice  [ ]Child Life  [ ]Social Work  [ ]Case management [ ]Holistic Therapy     Goals of Care Document:

## 2022-02-18 NOTE — PROGRESS NOTE ADULT - SUBJECTIVE AND OBJECTIVE BOX
Patient is a 89y old  Female who presents with a chief complaint of failure to thrive,  diastolic and systolic chf, Large volume ascites,  ko, comfort care, DNR/DNI (18 Feb 2022 09:36)  Patient not responsive. agonal respirations- Process od death has started.     INTERVAL HPI/OVERNIGHT EVENTS:  PAST MEDICAL & SURGICAL HISTORY:  CAD (coronary artery disease)    Diabetes mellitus, type 2    HLD (hyperlipidemia)    HTN (hypertension), benign    Hypothyroidism    Congestive heart failure (CHF)  lvef 30%    Diabetes    Hypothyroidism    Ascites    History of hysterectomy    History of cataract extraction    History of thyroidectomy, total        MEDICATIONS  (STANDING):  furosemide   Injectable 40 milliGRAM(s) IV Push two times a day  HYDROmorphone  Injectable 0.5 milliGRAM(s) IV Push every 8 hours    MEDICATIONS  (PRN):  bisacodyl Suppository 10 milliGRAM(s) Rectal daily PRN Constipation  HYDROmorphone  Injectable 0.5 milliGRAM(s) IV Push every 6 hours PRN Pain  magnesium hydroxide Suspension 30 milliLiter(s) Oral daily PRN Constipation      Allergies    No Known Allergies    Intolerances        REVIEW OF SYSTEMS:  CONSTITUTIONAL: No fever, weight loss, or fatigue  EYES: No eye pain, visual disturbances, or discharge  ENMT:  No difficulty hearing, tinnitus, vertigo; No sinus or throat pain  NECK: No pain or stiffness  BREASTS: No pain, masses, or nipple discharge  RESPIRATORY: No cough, wheezing, chills or hemoptysis; No shortness of breath  CARDIOVASCULAR: No chest pain, palpitations, dizziness, or leg swelling  GASTROINTESTINAL: No abdominal or epigastric pain. No nausea, vomiting, or hematemesis; No diarrhea or constipation. No melena or hematochezia.  GENITOURINARY: No dysuria, frequency, hematuria, or incontinence  NEUROLOGICAL: No headaches, memory loss, loss of strength, numbness, or tremors  SKIN: No itching, burning, rashes, or lesions   LYMPH NODES: No enlarged glands  ENDOCRINE: No heat or cold intolerance; No hair loss  MUSCULOSKELETAL: No joint pain or swelling; No muscle, back, or extremity pain  PSYCHIATRIC: No depression, anxiety, mood swings, or difficulty sleeping  HEME/LYMPH: No easy bruising, or bleeding gums  ALLERY AND IMMUNOLOGIC: No hives or eczema    Vital Signs Last 24 Hrs  T(C): 35.7 (18 Feb 2022 07:13), Max: 36.7 (17 Feb 2022 11:22)  T(F): 96.3 (18 Feb 2022 07:13), Max: 98 (17 Feb 2022 11:22)  HR: 57 (18 Feb 2022 07:13) (57 - 119)  BP: 88/63 (18 Feb 2022 00:25) (88/63 - 115/84)  BP(mean): --  RR: 10 (18 Feb 2022 07:13) (8 - 19)  SpO2: 94% (18 Feb 2022 07:13) (93% - 96%)    PHYSICAL EXAM:  GENERAL:, well-groomed, well-developed. patient is dying  HEAD:  Atraumatic, Normocephalic  EYES: EOMI, PERRLA, conjunctiva and sclera clear  ENMT: No tonsillar erythema, exudates, or enlargement; Moist mucous membranes, Good dentition, No lesions  NECK: Supple, No JVD, Normal thyroid  NERVOUS SYSTEM:  Alert & Oriented X3, Good concentration; Motor Strength 5/5 B/L upper and lower extremities; DTRs 2+ intact and symmetric  CHEST/LUNG:  agonal respirations  HEART: Regular rate and rhythm; No murmurs, rubs, or gallops  ABDOMEN: Soft, Nontender, Nondistended; Bowel sounds present  EXTREMITIES:  2+ Peripheral Pulses, No clubbing, cyanosis, or edema  LYMPH: No lymphadenopathy noted  SKIN: No rashes or lesions    LABS:    02-16    134<L>  |  103  |  89<H>  ----------------------------<  130<H>  6.1<H>   |  22  |  4.11<H>    Ca    8.8      16 Feb 2022 12:22  Phos  6.5     02-16  Mg     2.6     02-16            CAPILLARY BLOOD GLUCOSE      POCT Blood Glucose.: 94 mg/dL (17 Feb 2022 16:09)  POCT Blood Glucose.: 101 mg/dL (17 Feb 2022 11:16)                RADIOLOGY & ADDITIONAL TESTS:    Imaging Personally Reviewed:  [ ] YES  [ ] NO    Consultant(s) Notes Reviewed:  [ ] YES  [ ] NO    Care Discussed with Consultants/Other Providers [ ] YES  [ ] NO    Care discussed with family,         [  ]   yes  [  ]  No    imp:    stable[ ]    unstable[  ]     improving [   ]       unchanged  [  ]                Plans:  Continue present plans  [  ] comfort care, oxygen               New consult [  ]   specialty  .......               order test[  ]    test name.                  Discharge Planning  [  ]            Patient is a 89y old  Female who presents with a chief complaint of failure to thrive,  diastolic and systolic chf, Large volume ascites,  ko, comfort care, DNR/DNI (18 Feb 2022 09:00)  Patient not responsive. agonal respirations- Process od death has started.     INTERVAL HPI/OVERNIGHT EVENTS:  PAST MEDICAL & SURGICAL HISTORY:  CAD (coronary artery disease)    Diabetes mellitus, type 2    HLD (hyperlipidemia)    HTN (hypertension), benign    Hypothyroidism    Congestive heart failure (CHF)  lvef 30%    Diabetes    Hypothyroidism    Ascites    History of hysterectomy    History of cataract extraction    History of thyroidectomy, total        MEDICATIONS  (STANDING):  furosemide   Injectable 40 milliGRAM(s) IV Push two times a day  HYDROmorphone  Injectable 0.5 milliGRAM(s) IV Push every 8 hours    MEDICATIONS  (PRN):  bisacodyl Suppository 10 milliGRAM(s) Rectal daily PRN Constipation  HYDROmorphone  Injectable 0.5 milliGRAM(s) IV Push every 6 hours PRN Pain  magnesium hydroxide Suspension 30 milliLiter(s) Oral daily PRN Constipation      Allergies    No Known Allergies    Intolerances        REVIEW OF SYSTEMS:  CONSTITUTIONAL: No fever, weight loss, or fatigue  EYES: No eye pain, visual disturbances, or discharge  ENMT:  No difficulty hearing, tinnitus, vertigo; No sinus or throat pain  NECK: No pain or stiffness  BREASTS: No pain, masses, or nipple discharge  RESPIRATORY: No cough, wheezing, chills or hemoptysis; No shortness of breath  CARDIOVASCULAR: No chest pain, palpitations, dizziness, or leg swelling  GASTROINTESTINAL: No abdominal or epigastric pain. No nausea, vomiting, or hematemesis; No diarrhea or constipation. No melena or hematochezia.  GENITOURINARY: No dysuria, frequency, hematuria, or incontinence  NEUROLOGICAL: No headaches, memory loss, loss of strength, numbness, or tremors  SKIN: No itching, burning, rashes, or lesions   LYMPH NODES: No enlarged glands  ENDOCRINE: No heat or cold intolerance; No hair loss  MUSCULOSKELETAL: No joint pain or swelling; No muscle, back, or extremity pain  PSYCHIATRIC: No depression, anxiety, mood swings, or difficulty sleeping  HEME/LYMPH: No easy bruising, or bleeding gums  ALLERY AND IMMUNOLOGIC: No hives or eczema    Vital Signs Last 24 Hrs  T(C): 35.7 (18 Feb 2022 07:13), Max: 36.7 (17 Feb 2022 11:22)  T(F): 96.3 (18 Feb 2022 07:13), Max: 98 (17 Feb 2022 11:22)  HR: 57 (18 Feb 2022 07:13) (57 - 119)  BP: 88/63 (18 Feb 2022 00:25) (88/63 - 115/84)  BP(mean): --  RR: 10 (18 Feb 2022 07:13) (8 - 19)  SpO2: 94% (18 Feb 2022 07:13) (93% - 96%)    PHYSICAL EXAM:  GENERAL:, well-groomed, well-developed. patient is dying  HEAD:  Atraumatic, Normocephalic  EYES: EOMI, PERRLA, conjunctiva and sclera clear  ENMT: No tonsillar erythema, exudates, or enlargement; Moist mucous membranes, Good dentition, No lesions  NECK: Supple, No JVD, Normal thyroid  NERVOUS SYSTEM:  Alert & Oriented X3, Good concentration; Motor Strength 5/5 B/L upper and lower extremities; DTRs 2+ intact and symmetric  CHEST/LUNG:  agonal respirations  HEART: Regular rate and rhythm; No murmurs, rubs, or gallops  ABDOMEN: Soft, Nontender, Nondistended; Bowel sounds present  EXTREMITIES:  2+ Peripheral Pulses, No clubbing, cyanosis, or edema  LYMPH: No lymphadenopathy noted  SKIN: No rashes or lesions    LABS:    02-16    134<L>  |  103  |  89<H>  ----------------------------<  130<H>  6.1<H>   |  22  |  4.11<H>    Ca    8.8      16 Feb 2022 12:22  Phos  6.5     02-16  Mg     2.6     02-16            CAPILLARY BLOOD GLUCOSE      POCT Blood Glucose.: 94 mg/dL (17 Feb 2022 16:09)  POCT Blood Glucose.: 101 mg/dL (17 Feb 2022 11:16)                RADIOLOGY & ADDITIONAL TESTS:    Imaging Personally Reviewed:  [ ] YES  [ ] NO    Consultant(s) Notes Reviewed:  [ ] YES  [ ] NO    Care Discussed with Consultants/Other Providers [ ] YES  [ ] NO    Care discussed with family,         [  ]   yes  [  ]  No    imp:    stable[ ]    unstable[  ]     improving [   ]       unchanged  [  ]                Plans:  Continue present plans  [  ] comfort care, oxygen               New consult [  ]   specialty  .......               order test[  ]    test name.                  Discharge Planning  [  ]

## 2022-02-18 NOTE — DISCHARGE NOTE FOR THE EXPIRED PATIENT - HOSPITAL COURSE
Patient is a 89 year old female with PMHx of CHF presented to ER for sob, LE edema, fluid retention.  Pt. was told to come to ER by visiting nurse.  Pt has a history of CHF, but it's never been this bad, insidious onset.  No other specific inciting event.  Daughter at bedside corroborates history.  Patient admitted for CHF exacerbation with ascites, s/p paracentesis on 2/15 with 3L of transudative fluid removed.  Echo showing severe tricuspid regurgitation, Mod AS,  Mild PUl HTN- LVEF 30%.  Patient is not  a candidate for b blockers or  ACT as per cardiology.  Patient terminally ill, family decided on comfort care and patient accepted to inpatient hospice on 2/17.      On 2/18 at 9:18AM, notified by RN that patient has no vitals signs.  Patient seen immediately at beside.  On physical exam, patient did not respond to verbal or noxious stimuli.  No spontaneous respirations.  Absent heart and breath sounds.  Absent radial and carotid pulses.  Pupils are fixed and dilated, no corneal reflex.  EKG rhythm strip shows asystole.   Patient pronounced dead at 9:20AM.  Attending, Dr. Eisenberg notified.  Patient's daughter, Jaz Snell informed, condolences and emotional support provided.

## 2022-02-18 NOTE — CHART NOTE - NSCHARTNOTEFT_GEN_A_CORE
Notified by RN that patient has no vitals signs.  Patient seen immediately at beside.  On physical exam, patient did not respond to verbal or noxious stimuli.  No spontaneous respirations.  Absent heart and breath sounds.  Absent radial and carotid pulses.  Pupils are fixed and dilated, no corneal reflex.  EKG rhythm strip shows asystole.   Patient pronounced dead at 9:20AM.  Attending, Dr. Aceves notified.  Patient's daughter, Jaz Snell informed, condolences and emotional support provided. Notified by RN that patient has no vitals signs.  Patient seen immediately at beside.  On physical exam, patient did not respond to verbal or noxious stimuli.  No spontaneous respirations.  Absent heart and breath sounds.  Absent radial and carotid pulses.  Pupils are fixed and dilated, no corneal reflex.  EKG rhythm strip shows asystole.   Patient pronounced dead at 9:20AM.  Attending, Dr. Eisenberg notified.  Patient's daughter, Jaz Snell informed, condolences and emotional support provided.

## 2022-02-20 LAB
CULTURE RESULTS: SIGNIFICANT CHANGE UP
CULTURE RESULTS: SIGNIFICANT CHANGE UP
SPECIMEN SOURCE: SIGNIFICANT CHANGE UP
SPECIMEN SOURCE: SIGNIFICANT CHANGE UP

## 2022-02-22 DIAGNOSIS — Z79.890 HORMONE REPLACEMENT THERAPY: ICD-10-CM

## 2022-02-22 DIAGNOSIS — I07.1 RHEUMATIC TRICUSPID INSUFFICIENCY: ICD-10-CM

## 2022-02-22 DIAGNOSIS — Z66 DO NOT RESUSCITATE: ICD-10-CM

## 2022-02-22 DIAGNOSIS — R68.0 HYPOTHERMIA, NOT ASSOCIATED WITH LOW ENVIRONMENTAL TEMPERATURE: ICD-10-CM

## 2022-02-22 DIAGNOSIS — Z51.5 ENCOUNTER FOR PALLIATIVE CARE: ICD-10-CM

## 2022-02-22 DIAGNOSIS — I13.0 HYPERTENSIVE HEART AND CHRONIC KIDNEY DISEASE WITH HEART FAILURE AND STAGE 1 THROUGH STAGE 4 CHRONIC KIDNEY DISEASE, OR UNSPECIFIED CHRONIC KIDNEY DISEASE: ICD-10-CM

## 2022-02-22 DIAGNOSIS — Z86.010 PERSONAL HISTORY OF COLONIC POLYPS: ICD-10-CM

## 2022-02-22 DIAGNOSIS — E11.9 TYPE 2 DIABETES MELLITUS WITHOUT COMPLICATIONS: ICD-10-CM

## 2022-02-22 DIAGNOSIS — Z79.84 LONG TERM (CURRENT) USE OF ORAL HYPOGLYCEMIC DRUGS: ICD-10-CM

## 2022-02-22 DIAGNOSIS — R18.8 OTHER ASCITES: ICD-10-CM

## 2022-02-22 DIAGNOSIS — E11.22 TYPE 2 DIABETES MELLITUS WITH DIABETIC CHRONIC KIDNEY DISEASE: ICD-10-CM

## 2022-02-22 DIAGNOSIS — R33.9 RETENTION OF URINE, UNSPECIFIED: ICD-10-CM

## 2022-02-22 DIAGNOSIS — E78.5 HYPERLIPIDEMIA, UNSPECIFIED: ICD-10-CM

## 2022-02-22 DIAGNOSIS — R06.02 SHORTNESS OF BREATH: ICD-10-CM

## 2022-02-22 DIAGNOSIS — N17.9 ACUTE KIDNEY FAILURE, UNSPECIFIED: ICD-10-CM

## 2022-02-22 DIAGNOSIS — N18.30 CHRONIC KIDNEY DISEASE, STAGE 3 UNSPECIFIED: ICD-10-CM

## 2022-02-22 DIAGNOSIS — R62.7 ADULT FAILURE TO THRIVE: ICD-10-CM

## 2022-02-22 DIAGNOSIS — I50.43 ACUTE ON CHRONIC COMBINED SYSTOLIC (CONGESTIVE) AND DIASTOLIC (CONGESTIVE) HEART FAILURE: ICD-10-CM

## 2022-02-22 DIAGNOSIS — G93.41 METABOLIC ENCEPHALOPATHY: ICD-10-CM

## 2022-02-22 DIAGNOSIS — R79.89 OTHER SPECIFIED ABNORMAL FINDINGS OF BLOOD CHEMISTRY: ICD-10-CM

## 2022-02-22 DIAGNOSIS — I95.9 HYPOTENSION, UNSPECIFIED: ICD-10-CM

## 2022-02-22 DIAGNOSIS — I27.20 PULMONARY HYPERTENSION, UNSPECIFIED: ICD-10-CM

## 2022-02-22 DIAGNOSIS — J81.1 CHRONIC PULMONARY EDEMA: ICD-10-CM

## 2022-02-22 DIAGNOSIS — E03.9 HYPOTHYROIDISM, UNSPECIFIED: ICD-10-CM

## 2022-02-22 DIAGNOSIS — R94.31 ABNORMAL ELECTROCARDIOGRAM [ECG] [EKG]: ICD-10-CM

## 2022-02-22 DIAGNOSIS — K76.1 CHRONIC PASSIVE CONGESTION OF LIVER: ICD-10-CM

## 2022-02-24 DIAGNOSIS — I08.2 RHEUMATIC DISORDERS OF BOTH AORTIC AND TRICUSPID VALVES: ICD-10-CM

## 2022-02-24 DIAGNOSIS — N17.9 ACUTE KIDNEY FAILURE, UNSPECIFIED: ICD-10-CM

## 2022-02-24 DIAGNOSIS — Z66 DO NOT RESUSCITATE: ICD-10-CM

## 2022-02-24 DIAGNOSIS — E03.9 HYPOTHYROIDISM, UNSPECIFIED: ICD-10-CM

## 2022-02-24 DIAGNOSIS — R62.7 ADULT FAILURE TO THRIVE: ICD-10-CM

## 2022-02-24 DIAGNOSIS — I25.10 ATHEROSCLEROTIC HEART DISEASE OF NATIVE CORONARY ARTERY WITHOUT ANGINA PECTORIS: ICD-10-CM

## 2022-02-24 DIAGNOSIS — Z51.5 ENCOUNTER FOR PALLIATIVE CARE: ICD-10-CM

## 2022-02-24 DIAGNOSIS — E78.5 HYPERLIPIDEMIA, UNSPECIFIED: ICD-10-CM

## 2022-02-24 DIAGNOSIS — E11.9 TYPE 2 DIABETES MELLITUS WITHOUT COMPLICATIONS: ICD-10-CM

## 2022-02-24 DIAGNOSIS — I50.42 CHRONIC COMBINED SYSTOLIC (CONGESTIVE) AND DIASTOLIC (CONGESTIVE) HEART FAILURE: ICD-10-CM

## 2022-02-24 DIAGNOSIS — R18.8 OTHER ASCITES: ICD-10-CM

## 2022-02-24 DIAGNOSIS — I11.0 HYPERTENSIVE HEART DISEASE WITH HEART FAILURE: ICD-10-CM

## 2023-03-18 NOTE — PROGRESS NOTE ADULT - SUBJECTIVE AND OBJECTIVE BOX
Updated patient to Dr. Saenz instructions via [email or web address].  Instructed to follow up next week if symtoms get worse.    Sangita Barth RN, BSN, PHN  [facility] Nurse Navigator  [phone]     Patient is a 89y old  Female who presents with a chief complaint of dyspnea (15 Feb 2022 18:57)      INTERVAL HPI/OVERNIGHT EVENTS: Pt looks very sick, squeezing hand, opens eyes at times, but mostly just sleeping, not answering much questions    MEDICATIONS  (STANDING):  atorvastatin 10 milliGRAM(s) Oral at bedtime  dextrose 40% Gel 15 Gram(s) Oral once  dextrose 5%. 1000 milliLiter(s) (50 mL/Hr) IV Continuous <Continuous>  dextrose 5%. 1000 milliLiter(s) (100 mL/Hr) IV Continuous <Continuous>  dextrose 5%. 1000 milliLiter(s) (50 mL/Hr) IV Continuous <Continuous>  dextrose 50% Injectable 25 Gram(s) IV Push once  dextrose 50% Injectable 25 Gram(s) IV Push once  dextrose 50% Injectable 12.5 Gram(s) IV Push once  furosemide   Injectable 40 milliGRAM(s) IV Push two times a day  glucagon  Injectable 1 milliGRAM(s) IntraMuscular once  influenza  Vaccine (HIGH DOSE) 0.7 milliLiter(s) IntraMuscular once  insulin lispro (ADMELOG) corrective regimen sliding scale   SubCutaneous three times a day before meals  insulin lispro (ADMELOG) corrective regimen sliding scale   SubCutaneous three times a day before meals  levothyroxine 50 MICROGram(s) Oral daily  metoprolol tartrate 25 milliGRAM(s) Oral two times a day    MEDICATIONS  (PRN):      Allergies    No Known Allergies    Intolerances        REVIEW OF SYSTEMS:  CONSTITUTIONAL: No fever, weight loss, or fatigue  EYES: No eye pain, visual disturbances, or discharge  ENMT:  No difficulty hearing, tinnitus, vertigo; No sinus or throat pain  NECK: No pain or stiffness  BREASTS: No pain, masses, or nipple discharge  RESPIRATORY: No cough, wheezing, chills or hemoptysis; No shortness of breath  CARDIOVASCULAR: No chest pain, palpitations, dizziness, or leg swelling  GASTROINTESTINAL: No abdominal or epigastric pain. No nausea, vomiting, or hematemesis; No diarrhea or constipation. No melena or hematochezia.  GENITOURINARY: No dysuria, frequency, hematuria, or incontinence  NEUROLOGICAL: No headaches, memory loss, loss of strength, numbness, or tremors  SKIN: No itching, burning, rashes, or lesions   LYMPH NODES: No enlarged glands  ENDOCRINE: No heat or cold intolerance; No hair loss  MUSCULOSKELETAL: No joint pain or swelling; No muscle, back, or extremity pain  PSYCHIATRIC: No depression, anxiety, mood swings, or difficulty sleeping  HEME/LYMPH: No easy bruising, or bleeding gums  ALLERGY AND IMMUNOLOGIC: No hives or eczema    Vital Signs Last 24 Hrs  T(C): 36.1 (15 Feb 2022 16:42), Max: 36.6 (15 Feb 2022 10:30)  T(F): 97 (15 Feb 2022 16:42), Max: 97.9 (15 Feb 2022 10:30)  HR: 105 (15 Feb 2022 16:42) (83 - 108)  BP: 90/61 (15 Feb 2022 16:42) (90/61 - 118/80)  BP(mean): --  RR: 20 (15 Feb 2022 16:42) (16 - 20)  SpO2: 92% (15 Feb 2022 16:42) (92% - 100%)    PHYSICAL EXAM:  GENERAL: NAD, ill looking  HEAD:  Atraumatic, Normocephalic  EYES: EOMI, PERRLA, conjunctiva and sclera clear  ENMT: No tonsillar erythema, exudates, or enlargement; Moist mucous membranes, Good dentition, No lesions  NECK: Supple, No JVD,  NERVOUS SYSTEM:  Alert & Oriented X0  CHEST/LUNG: Clear to percussion bilaterally; No rales, rhonchi, wheezing, or rubs  HEART: Regular rate and rhythm; No murmurs, rubs, or gallops  ABDOMEN: moderately distended  Bowel sounds present  EXTREMITIES:  2+ Peripheral Pulses, No clubbing, cyanosis, +2 edema of upper ext/lower ext  LYMPH: No lymphadenopathy noted  SKIN: No rashes or lesions    LABS:                        14.0   7.55  )-----------( 254      ( 15 Feb 2022 08:25 )             45.8     02-15    135  |  103  |  81<H>  ----------------------------<  69<L>  5.8<H>   |  23  |  3.63<H>    Ca    9.5      15 Feb 2022 12:05  Phos  6.6     02-15  Mg     2.5     02-15    TPro  7.1  /  Alb  2.7<L>  /  TBili  1.3<H>  /  DBili  x   /  AST  40<H>  /  ALT  23  /  AlkPhos  153<H>  02-15    PT/INR - ( 15 Feb 2022 12:05 )   PT: 15.0 sec;   INR: 1.31 ratio           Urinalysis Basic - ( 2022 10:39 )    Color: Yellow / Appearance: Clear / S.020 / pH: x  Gluc: x / Ketone: Trace  / Bili: Small / Urobili: 1 mg/dL   Blood: x / Protein: 100 mg/dL / Nitrite: Negative   Leuk Esterase: Small / RBC: 11-25 /HPF / WBC 11-25   Sq Epi: x / Non Sq Epi: Moderate / Bacteria: Moderate      CAPILLARY BLOOD GLUCOSE      POCT Blood Glucose.: 129 mg/dL (15 Feb 2022 16:23)  POCT Blood Glucose.: 76 mg/dL (15 Feb 2022 14:01)  POCT Blood Glucose.: 67 mg/dL (15 Feb 2022 13:19)  POCT Blood Glucose.: 70 mg/dL (15 Feb 2022 11:37)  POCT Blood Glucose.: 67 mg/dL (15 Feb 2022 11:36)  POCT Blood Glucose.: 75 mg/dL (15 Feb 2022 07:40)  POCT Blood Glucose.: 96 mg/dL (2022 21:48)      RADIOLOGY & ADDITIONAL TESTS:    Imaging Personally Reviewed:  [ ] YES  [ ] NO    Consultant(s) Notes Reviewed:  [ ] YES  [ ] NO    Care Discussed with Consultants/Other Providers [ ] YES  [ ] NO